# Patient Record
Sex: FEMALE | Race: WHITE | NOT HISPANIC OR LATINO | Employment: FULL TIME | URBAN - METROPOLITAN AREA
[De-identification: names, ages, dates, MRNs, and addresses within clinical notes are randomized per-mention and may not be internally consistent; named-entity substitution may affect disease eponyms.]

---

## 2018-08-30 ENCOUNTER — OFFICE VISIT (OUTPATIENT)
Dept: SURGERY | Facility: CLINIC | Age: 63
End: 2018-08-30

## 2018-08-30 VITALS
HEIGHT: 67 IN | DIASTOLIC BLOOD PRESSURE: 63 MMHG | BODY MASS INDEX: 21.87 KG/M2 | HEART RATE: 95 BPM | SYSTOLIC BLOOD PRESSURE: 141 MMHG | WEIGHT: 139.31 LBS

## 2018-08-30 DIAGNOSIS — C20 RECTAL CANCER: Primary | ICD-10-CM

## 2018-08-30 PROCEDURE — 99999 PR PBB SHADOW E&M-NEW PATIENT-LVL IV: CPT | Mod: PBBFAC,,, | Performed by: COLON & RECTAL SURGERY

## 2018-08-30 PROCEDURE — 45330 DIAGNOSTIC SIGMOIDOSCOPY: CPT | Mod: S$PBB,,, | Performed by: COLON & RECTAL SURGERY

## 2018-08-30 PROCEDURE — 99204 OFFICE O/P NEW MOD 45 MIN: CPT | Mod: PBBFAC | Performed by: COLON & RECTAL SURGERY

## 2018-08-30 PROCEDURE — 99205 OFFICE O/P NEW HI 60 MIN: CPT | Mod: 25,S$PBB,, | Performed by: COLON & RECTAL SURGERY

## 2018-08-30 PROCEDURE — 45330 DIAGNOSTIC SIGMOIDOSCOPY: CPT | Mod: PBBFAC | Performed by: COLON & RECTAL SURGERY

## 2018-08-30 RX ORDER — CALCIUM CARBONATE 200(500)MG
1 TABLET,CHEWABLE ORAL DAILY PRN
COMMUNITY
End: 2018-12-12

## 2018-08-30 RX ORDER — CHOLECALCIFEROL (VITAMIN D3) 25 MCG
1000 TABLET ORAL DAILY
COMMUNITY
End: 2018-11-27 | Stop reason: CLARIF

## 2018-08-30 RX ORDER — AMLODIPINE BESYLATE 10 MG/1
10 TABLET ORAL DAILY
COMMUNITY
End: 2018-11-27 | Stop reason: CLARIF

## 2018-08-30 RX ORDER — TRAMADOL HYDROCHLORIDE 50 MG/1
50 TABLET ORAL EVERY 6 HOURS PRN
COMMUNITY
End: 2018-12-12

## 2018-08-30 RX ORDER — IBUPROFEN 200 MG
200 TABLET ORAL EVERY 6 HOURS PRN
COMMUNITY

## 2018-08-30 NOTE — PROGRESS NOTES
[facility]  History & Physical    Patient Name: Susannah Xie  MRN: [id number]  Primary Care Provider: Primary Doctor No    Patient information was obtained from patient and past medical records.     Subjective:     Chief Complaint/Reason for Admission: rectal mass    History of Present Illness:  Patient is a 63 y.o. female with a PMH of COPD who presents with a history of rectal bleeding that started back in March. She was seen by her physician and was referred to have a colonoscopy. This was done 7/24/18 during which she was found to have a rectal mass as well as a cecal polyp. The rectal mass was ~5cm from the anal verge and was on the posterior wall. Biopsy of the mass was positive for adenocarcinoma. She also had a cecal polyp that was completely removed; pathology was a tubulovillous adenoma. She then underwent a MRI of the pelvis as well as a PET scan. She has no family history of colon cancer. She has not lost significant weight in the past few months. She has seen med and rad onc and a surgeon but presents today for a second opinion.    She had an appendectomy and  TAHBSO in the past. She smokes 1.5ppd x30+ years. She currently still smokes. She can climb a flight of stairs with some difficulty and gets dyspneic at the end. She has no history of an MI or CVA.    No current outpatient medications on file prior to visit.     No current facility-administered medications on file prior to visit.        Review of patient's allergies indicates:  Allergies not on file    Past Medical History:   Diagnosis Date    COPD (chronic obstructive pulmonary disease) with chronic bronchitis     Tobacco abuse      Past Surgical History:   Procedure Laterality Date    APPENDECTOMY      TOTAL ABDOMINAL HYSTERECTOMY W/ BILATERAL SALPINGOOPHORECTOMY       Family History     None        Tobacco Use    Smoking status: Current Every Day Smoker     Packs/day: 1.50     Years: 30.00     Pack years: 45.00     Types:  Cigarettes   Substance and Sexual Activity    Alcohol use: Yes     Frequency: Monthly or less     Drinks per session: 1 or 2     Binge frequency: Never    Drug use: No    Sexual activity: Not on file     Review of Systems   Constitutional: Negative for activity change, appetite change and unexpected weight change.   HENT: Negative.    Respiratory: Positive for cough and wheezing. Negative for shortness of breath.    Cardiovascular: Negative for chest pain and palpitations.   Gastrointestinal: Positive for anal bleeding and blood in stool. Negative for abdominal distention, abdominal pain, diarrhea, nausea and rectal pain.   Genitourinary: Negative for difficulty urinating and dysuria.   Musculoskeletal: Positive for back pain.   Skin: Negative.    Neurological: Negative.    Hematological: Negative.  Negative for adenopathy. Does not bruise/bleed easily.     Objective:     Vital Signs (Most Recent):  Pulse: 95 (08/30/18 0836)  BP: (!) 141/63 (08/30/18 0836) Vital Signs (24h Range):  [unfilled]     Weight: 63.2 kg (139 lb 5.3 oz)  Body mass index is 21.82 kg/m².    Physical Exam   Constitutional: She is oriented to person, place, and time. She appears well-developed and well-nourished. No distress.   HENT:   Head: Normocephalic and atraumatic.   Eyes: No scleral icterus.   Neck: Normal range of motion. Neck supple.   Cardiovascular: Normal rate and regular rhythm.   Pulmonary/Chest: Effort normal and breath sounds normal.   Abdominal: Soft. Bowel sounds are normal. She exhibits no distension and no mass. There is no tenderness.   Musculoskeletal: Normal range of motion.   Neurological: She is alert and oriented to person, place, and time.   Skin: Skin is warm and dry.     After verbal consent and 2 fleets enemas flexible sigmoidoscopy was performed the lesion was identified at 5 cm from the dentate line. There was space above the anorectal ring.  The lesion was tattooed  Significant Labs:  None    Significant  Diagnostics:  MRI pelvis 8/20/18:    1. Rectal neoplasm involving the middle and distal thirds of the rectum with extension to within 1mm of the mesorectal fascia in 2 locations.  2. Multiple suspicious perirectal lymph nodes  3. T3 (MRF positive) N2    Colonoscopy 7/24/18:  Ulcerated right posterior lateral rectal mass  Large flat polypoid lesion cecum    PET CT 8/20/18:  1. Rectal neoplasm with perirectal hypermetabolic lymph nodes and/or direct extension of tumor into the perirectal fat.  2. No evidence of distant metastatic disease.    PATHOLOGY 7/24/18:    1. Polyp, cecum:  - Tubulovillous adnoma with mild to focally moderate glandular atypia    2. Rectal mass, biopsy:  - Invasive moderately to focally poorly differentiated adenocarcinoma with moderate extracellular mucin production by tumor and a few tumor cells with signet ring morphology (poorly differentiated adenocarcinoma).   - Lymphovascular invasion is present.  - Adjacent tubulovillous adenoma with moderate glandular atypia  - Mucicarmine stain positive for mucin with extracellular mucin and within gland like spaces of invasive adenocarcinoma with appropriate staining of mucin in the control slide.    Assessment/Plan:     64 yo W presenting with rectal cancer    1. Flexible sigmoidoscopy done in clinic; revealed large rectal mass that ~5cm from the anal verge  2. She will need neoadjuvant chemoradiation. This can be coordinated closer to where she lives.  3. She will follow up after she has completed her neoadjuvant treatment to discuss surgery; planning for robotic resection in December 2018.  4. Smoking cessation counseling done.    Dottie Orellana MD  General Surgery  [facility]    She will follow up approximately 4-6 weeks month after completing neoadjuvant therapy.  Will plan on repeating an MRI at that visit also tentatively scheduling surgery for December 12th based on the prospective timing of 6 weeks of neoadjuvant  therapy and 10-12 weeks post treatment  Have seen and examined the patient with the fellow and agree with their plan.  SHREYA VELAZCO

## 2018-10-24 ENCOUNTER — TELEPHONE (OUTPATIENT)
Dept: SURGERY | Facility: CLINIC | Age: 63
End: 2018-10-24

## 2018-10-24 NOTE — TELEPHONE ENCOUNTER
Spoke with Cara Briceno's office and let her know we have her scheduled with us on 11/27 which should still fall in the 4-6 week timeframe after chemo completion.

## 2018-10-24 NOTE — TELEPHONE ENCOUNTER
----- Message from Shira Piedra sent at 10/24/2018  8:40 AM CDT -----  Contact: Cara tenorio/ Dr. Briceno Office in [place] [phone]  Cara called in regarding pt above. She had a delay in her treatment and she did not finished her chemo and cancer treatment until yesterday 10/23    Contact: Cara tenorio/ Dr. Briceno Office in [place] [phone]

## 2018-11-02 ENCOUNTER — TELEPHONE (OUTPATIENT)
Dept: SURGERY | Facility: CLINIC | Age: 63
End: 2018-11-02

## 2018-11-02 NOTE — TELEPHONE ENCOUNTER
----- Message from Sasha Costa sent at 11/2/2018  9:31 AM CDT -----  Contact: Pt:[phone]  .Needs Advice    Reason for call:Pt states she would like to speak with the nurse in regards to an appointment she has scheduled with .         Communication Preference:Pt:[phone]    Additional Information:

## 2018-11-06 ENCOUNTER — TELEPHONE (OUTPATIENT)
Dept: SURGERY | Facility: CLINIC | Age: 63
End: 2018-11-06

## 2018-11-06 NOTE — TELEPHONE ENCOUNTER
----- Message from Nayely Cooley sent at 11/6/2018 11:41 AM CST -----  Contact: self  Pt called in about wanting to speak with gregg grajeda. Pt would like to discuss something about her upcoming surgery      Pt can be reached at [phone]      TY

## 2018-11-13 ENCOUNTER — TELEPHONE (OUTPATIENT)
Dept: PREADMISSION TESTING | Facility: HOSPITAL | Age: 63
End: 2018-11-13

## 2018-11-13 DIAGNOSIS — C20 RECTAL CANCER: ICD-10-CM

## 2018-11-13 DIAGNOSIS — Z01.818 PRE-OP EVALUATION: Primary | ICD-10-CM

## 2018-11-13 NOTE — TELEPHONE ENCOUNTER
----- Message from Radha Perry LPN sent at 11/13/2018  8:48 AM CST -----  Pt is coming in 11/27/18 for her pre op visit. She will need medical clearance for surgery.  Her surgery date will probably be 12/13/18. On the 27th she does have several appts already.   Thanks   Radha

## 2018-11-13 NOTE — TELEPHONE ENCOUNTER
11/27 2p Stepan Garcia but this is all I have. Can you charge you appt. time are as you can see they will be late. DID NOT CALL PATIENT. Per your request.

## 2018-11-19 ENCOUNTER — TELEPHONE (OUTPATIENT)
Dept: SURGERY | Facility: CLINIC | Age: 63
End: 2018-11-19

## 2018-11-19 ENCOUNTER — ANESTHESIA EVENT (OUTPATIENT)
Dept: SURGERY | Facility: HOSPITAL | Age: 63
End: 2018-11-19

## 2018-11-19 ENCOUNTER — TELEPHONE (OUTPATIENT)
Dept: PREADMISSION TESTING | Facility: HOSPITAL | Age: 63
End: 2018-11-19

## 2018-11-19 DIAGNOSIS — Z01.818 PREOP TESTING: Primary | ICD-10-CM

## 2018-11-19 NOTE — TELEPHONE ENCOUNTER
Spoke with patient.  Patient would like to know if having a Bard Port is going to be a problem with having an MRI.  MRI contacted and they report that they do MRIs on patients with ports.  She should come in and they will evaluate it.  Also, she would like to know if she needs to have a colonoscopy before surgery.  Dr. Benavides will have to evaluate him before that is decided.

## 2018-11-19 NOTE — TELEPHONE ENCOUNTER
----- Message from Phyllis Trujillo RN sent at 11/19/2018 11:54 AM CST -----  Jg Arcos, This patient is a KR 4-Will need a POC/OPOC/Labs-Hem Prof/CMP/T&S/EKG appts., prior to surgery date of 12/12/18-XI Robotic Pull-Through,Coloanal Insertion-Catheter. Thank you. PJ

## 2018-11-19 NOTE — ANESTHESIA PREPROCEDURE EVALUATION
Anesthesia Assessment: Preoperative EQUATION    Planned Procedure: Procedure(s) (LRB):  XI ROBOTIC PULL-THROUGH, COLOANAL (N/A)  INSERTION-CATHETER (Left)  Requested Anesthesia Type:General  Surgeon: Jeyson Kelly MD  Service: Colon and Rectal  Known or anticipated Date of Surgery:12/12/2018    Surgeon notes: reviewed and Rectal cancer    Previous anesthesia records:Not available-Patient has had several surgeries done at [facility] facilities-s/p-Apppendectomy/s/p-SHERRIE w/Edison. Salpingoophorectomy    Last PCP note: No PCP at present  Tests already available:  No recent tests.      Plan:    Testing:  Hematology Profile, CMP, T&S and EKG     Patient  has previously scheduled Medical Appointment:Appointments on 11/27/18, prior to surgery date.    Navigation: Tests Scheduled. Labs-Hem Prof/CMP/T&S & EKG on 11/27/18 @ 11:30a & 12:20p             Consults scheduled.POC & Perioperative IM Consult on 11/27/18 @ 9a & 2p--RK 4             Results will be tracked by Preop Clinic.                Phyllis Trujillo RN  11/19/18 11/19/2018  Susannah Xie is a 63 y.o., female.    Pre-op Assessment         Review of Systems  Anesthesia Hx:  No problems with previous Anesthesia  History of prior surgery of interest to airway management or planning: Previous anesthesia: General colonoscopy 7/24/18; PORT PLACEMENT 9/2018 in [place] with general anesthesia.  Procedure performed at an [facility] Facility. Denies Family Hx of Anesthesia complications.    Social:  Smoker 0.5-1 ppd x 41 yrs; no alcohol   Hematology/Oncology:         -- Denies Anemia: Current/Recent Cancer. (rectal; s/p chemo, XRT last in 10/2018)   EENT/Dental:   Glasses   Cardiovascular:   Hypertension (off medication-Norvasc-only taken for 1-2 months; BP in /61) Denies MI.    Functional Capacity good / => 4 METS, active until March 2018-walking, housework, climb 1  FOS; denies CP, reports SOB with exertion which is not new    Pulmonary:   COPD (prn Ventolin inhaler) Shortness of breath (with walking but not new) Denies Sleep Apnea.    Renal/:  Renal/ Normal     Hepatic/GI:   GERD Rectal cancer   Musculoskeletal:  Spine Disorders: lumbar    Neurological:   Denies CVA. Denies Seizures.  Pain , onset is acute , location of lower abdomnen , quality of aching/dull , severity is a 7    Endocrine:   Denies Diabetes.    Psych:  Psychiatric Normal           Physical Exam  General:  Well nourished    Airway/Jaw/Neck:  Airway Findings: Mouth Opening: Normal Tongue: Normal  General Airway Assessment: Adult  Jaw/Neck Findings:     Neck ROM: Normal ROM      Dental:  Dental Findings: molar caps     Heart/Vascular:  Vascular Findings: (LEFT CHEST PORT)        Mental Status:  Mental Status Findings:  Cooperative, Alert and Oriented       Pt was seen in POC 11/27/18; Medical optimization: please see EPIC notes for recommendations of pre-op medical consultant, Dr Castro, for perioperative medical management./Maryanne Deshpande RN

## 2018-11-19 NOTE — PRE ADMISSION SCREENING
Anesthesia Assessment: Preoperative EQUATION    Planned Procedure: Procedure(s) (LRB):  XI ROBOTIC PULL-THROUGH, COLOANAL (N/A)  INSERTION-CATHETER (Left)  Requested Anesthesia Type:General  Surgeon: Jeyson Kelly MD  Service: Colon and Rectal  Known or anticipated Date of Surgery:12/12/2018    Surgeon notes: reviewed and Rectal cancer    Previous anesthesia records:Not available-Patient has had several surgeries done at [facility] facilities-s/p-Apppendectomy/s/p-SHERRIE w/Edison. Salpingoophorectomy    Last PCP note: No PCP at present  Tests already available:  No recent tests.      Plan:    Testing:  Hematology Profile, CMP, T&S and EKG     Patient  has previously scheduled Medical Appointment:Appointments on 11/27/18, prior to surgery date.    Navigation: Tests Scheduled. Labs-Hem Prof/CMP/T&S & EKG on ?             Consults scheduled.POC & Perioperative IM Consult on ?--RK 4             Results will be tracked by Preop Clinic.                Phyllis Trujillo RN  11/19/18

## 2018-11-19 NOTE — TELEPHONE ENCOUNTER
----- Message from Kelsey Winkler sent at 11/19/2018  8:04 AM CST -----  Contact: pt#[phone]  Needs Advice    Reason for call:Pt is schedule for mri and she states that she has a metal port in left chest for chemo   Bard power port is the name of it. She also have questions about a right side colonoscopy    Communication Preference:call    Additional Information:

## 2018-11-27 ENCOUNTER — HOSPITAL ENCOUNTER (OUTPATIENT)
Dept: RADIOLOGY | Facility: HOSPITAL | Age: 63
Discharge: HOME | End: 2018-11-27
Attending: COLON & RECTAL SURGERY

## 2018-11-27 ENCOUNTER — HOSPITAL ENCOUNTER (OUTPATIENT)
Dept: CARDIOLOGY | Facility: CLINIC | Age: 63
Discharge: HOME | End: 2018-11-27

## 2018-11-27 ENCOUNTER — HOSPITAL ENCOUNTER (OUTPATIENT)
Dept: PREADMISSION TESTING | Facility: HOSPITAL | Age: 63
Discharge: HOME | End: 2018-11-27
Attending: ANESTHESIOLOGY

## 2018-11-27 ENCOUNTER — INITIAL CONSULT (OUTPATIENT)
Dept: INTERNAL MEDICINE | Facility: CLINIC | Age: 63
End: 2018-11-27

## 2018-11-27 VITALS
WEIGHT: 116.88 LBS | TEMPERATURE: 99 F | BODY MASS INDEX: 18.35 KG/M2 | OXYGEN SATURATION: 100 % | HEIGHT: 67 IN | SYSTOLIC BLOOD PRESSURE: 132 MMHG | DIASTOLIC BLOOD PRESSURE: 61 MMHG | HEART RATE: 95 BPM

## 2018-11-27 VITALS
OXYGEN SATURATION: 100 % | WEIGHT: 116.88 LBS | SYSTOLIC BLOOD PRESSURE: 132 MMHG | DIASTOLIC BLOOD PRESSURE: 61 MMHG | HEART RATE: 95 BPM | TEMPERATURE: 99 F | RESPIRATION RATE: 18 BRPM | HEIGHT: 67 IN | BODY MASS INDEX: 18.35 KG/M2

## 2018-11-27 DIAGNOSIS — R39.11 URINARY HESITANCY: ICD-10-CM

## 2018-11-27 DIAGNOSIS — Z01.818 PRE-OP EVALUATION: ICD-10-CM

## 2018-11-27 DIAGNOSIS — J34.9 SINUS PROBLEM: ICD-10-CM

## 2018-11-27 DIAGNOSIS — R07.89 ATYPICAL CHEST PAIN: ICD-10-CM

## 2018-11-27 DIAGNOSIS — R50.9 FEVER, UNSPECIFIED FEVER CAUSE: ICD-10-CM

## 2018-11-27 DIAGNOSIS — R42 POSTURAL DIZZINESS: ICD-10-CM

## 2018-11-27 DIAGNOSIS — J44.9 CHRONIC OBSTRUCTIVE PULMONARY DISEASE, UNSPECIFIED COPD TYPE: ICD-10-CM

## 2018-11-27 DIAGNOSIS — C20 RECTAL CANCER: ICD-10-CM

## 2018-11-27 DIAGNOSIS — Z72.0 TOBACCO ABUSE: ICD-10-CM

## 2018-11-27 DIAGNOSIS — K21.9 GASTROESOPHAGEAL REFLUX DISEASE, ESOPHAGITIS PRESENCE NOT SPECIFIED: ICD-10-CM

## 2018-11-27 DIAGNOSIS — R09.89 PALPABLE ABDOMINAL AORTA: ICD-10-CM

## 2018-11-27 DIAGNOSIS — Z01.818 PREOP EXAMINATION: Primary | ICD-10-CM

## 2018-11-27 LAB
CREAT SERPL-MCNC: 0.6 MG/DL (ref 0.5–1.4)
SAMPLE: NORMAL

## 2018-11-27 PROCEDURE — 99244 OFF/OP CNSLTJ NEW/EST MOD 40: CPT | Mod: S$PBB,,, | Performed by: HOSPITALIST

## 2018-11-27 PROCEDURE — 99213 OFFICE O/P EST LOW 20 MIN: CPT | Mod: PBBFAC,25 | Performed by: HOSPITALIST

## 2018-11-27 PROCEDURE — 93010 ELECTROCARDIOGRAM REPORT: CPT | Mod: S$PBB,,, | Performed by: INTERNAL MEDICINE

## 2018-11-27 PROCEDURE — 72197 MRI PELVIS W/O & W/DYE: CPT | Mod: 26,,, | Performed by: RADIOLOGY

## 2018-11-27 PROCEDURE — A9585 GADOBUTROL INJECTION: HCPCS | Performed by: COLON & RECTAL SURGERY

## 2018-11-27 PROCEDURE — 72197 MRI PELVIS W/O & W/DYE: CPT | Mod: TC

## 2018-11-27 PROCEDURE — 93005 ELECTROCARDIOGRAM TRACING: CPT | Mod: PBBFAC | Performed by: INTERNAL MEDICINE

## 2018-11-27 PROCEDURE — 99999 PR PBB SHADOW E&M-EST. PATIENT-LVL III: CPT | Mod: PBBFAC,,, | Performed by: HOSPITALIST

## 2018-11-27 PROCEDURE — 25500020 PHARM REV CODE 255: Performed by: COLON & RECTAL SURGERY

## 2018-11-27 RX ORDER — ACETAMINOPHEN 500 MG
5000 TABLET ORAL DAILY
COMMUNITY
End: 2018-12-12

## 2018-11-27 RX ORDER — LOPERAMIDE HCL 2 MG
2 TABLET ORAL 4 TIMES DAILY PRN
COMMUNITY

## 2018-11-27 RX ORDER — ESOMEPRAZOLE MAGNESIUM 20 MG/1
20 GRANULE, DELAYED RELEASE ORAL 2 TIMES DAILY
COMMUNITY

## 2018-11-27 RX ORDER — OXYCODONE HYDROCHLORIDE 5 MG/1
5 CAPSULE ORAL EVERY 6 HOURS PRN
COMMUNITY
End: 2018-12-12

## 2018-11-27 RX ORDER — ALBUTEROL SULFATE 90 UG/1
2 AEROSOL, METERED RESPIRATORY (INHALATION) 2 TIMES DAILY PRN
COMMUNITY

## 2018-11-27 RX ORDER — GADOBUTROL 604.72 MG/ML
6 INJECTION INTRAVENOUS
Status: COMPLETED | OUTPATIENT
Start: 2018-11-27 | End: 2018-11-27

## 2018-11-27 RX ADMIN — GADOBUTROL 6 ML: 604.72 INJECTION INTRAVENOUS at 11:11

## 2018-11-27 NOTE — HPI
History of present illness- I had the pleasure of meeting this pleasant 63 y.o. lady in the pre op clinic prior to her elective Abdominal surgery. The patient is new to me . Susannah was accompanied by daughter in law Jordan.    I have obtained the history by speaking to the patient and by reviewing the electronic health records.    Events leading up to surgery / History of presenting illness -    Rectal cancer  Has a sensation of having to have multiple bowel movements a day since March 2018   Low  Back pain, abdominal cramping  since March 2018   Started with rectal bleeding around May / June 2018  Had Radiation - 28 treatments- finished Oct 23 rd 2018  Had chemotherapy  - finished Oct 24 th 2018   As per per her, planned for chemo , post surgery  No longer has rectal bleeding   As per her , radiation caused mucus, per rectum   She has been troubled with moderate abdominal cramping that has got better  . Pain decreases with pain medication passing flatus , Tums, pain Medication .      Relevant health conditions of significance for the perioperative period/ History of presenting illness -    Patient Active Problem List    Diagnosis Date Noted    Screening for colon cancer 12/12/2018    COPD (chronic obstructive pulmonary disease) 11/27/2018    Rectal cancer 11/27/2018    Sinus problem 11/27/2018    Tobacco abuse 11/27/2018    Atypical chest pain 11/27/2018    Acid reflux 11/27/2018    Postural dizziness 11/27/2018    Urinary hesitancy 11/27/2018    Fever 11/27/2018    Palpable abdominal aorta 11/27/2018     Not known to have heart disease , Diabetes Mellitus,HTN

## 2018-11-27 NOTE — ASSESSMENT & PLAN NOTE
I  Informed about risk of wound healing problem ,infection,lung complications,thrombosis with tobacco use    I suggested to consider stopping  smoking tobacco for its benefits in the davonte operative period and in the long term  Suggested having a quit date ( she choose tomorrow ) and work towards it

## 2018-11-27 NOTE — LETTER
November 27, 2018      JADA Benavides MD  [address] [facility] [address]           [facility] - Pre Op Consult  [address] [facility] [address]  Phone: [phone]          Patient: Susannah Xie   MR Number: [id number]   YOB: 1955   Date of Visit: 11/27/2018       Dear Dr. Rhonda G Leopold:    Thank you for referring Susannah Xie to me for evaluation. Attached you will find relevant portions of my assessment and plan of care.    If you have questions, please do not hesitate to call me. I look forward to following Susannah Xie along with you.    Sincerely,    Lidya Castro MD    Enclosure  CC:  Rhonda G Leopold, MD    If you would like to receive this communication electronically, please contact [email or web address] or [phone] to request more information on [facility] access.    For providers and/or their staff who would like to refer a patient to [facility], please contact us through our one-stop-shop provider referral line, [facility], at [phone].    If you feel you have received this communication in error or would no longer like to receive these types of communications, please e-mail [email or web address]

## 2018-11-27 NOTE — ASSESSMENT & PLAN NOTE
Had once 11 th November 2018   Started with upper abdominal discomfort and worked its way through to the the Rt upper chest     Radiation-  None   After a heavier meal , as she was reclining    Associated with- no shortness of breath, sweating, nausea, vomiting diarrhea  CV risks   HTN-None  DM- None   HLD-un known   Tobacco - Yes  Family history -  Premature heart disease   Sounds GI in nature     Offered cardiology evaluation to reassure her which he deferred at this time

## 2018-11-27 NOTE — DISCHARGE INSTRUCTIONS
Your surgery has been scheduled for:__________________________________________    You should report to:  ____[facility], located on the [place] side of the first floor of the           [facility] ([phone])  ____[facility], located on the [facility] side of the            Second floor of the [facility] ([phone])  ____3rd Floor [facility] located on the [facility] side of the [facility] [phone]  Please Note   - Tell your doctor if you take Aspirin, products containing Aspirin, herbal medications  or blood thinners, such as Coumadin, Ticlid, or Plavix.  (Consult your provider regarding holding or stopping before surgery).  - Arrange for someone to drive you home following surgery.  You will not be allowed to leave the surgical facility alone or drive yourself home following sedation and anesthesia.  Before Surgery  - Stop taking all herbal medications 14days prior to surgery  - No Motrin/Advil (Ibuprofen) 7 days before surgery  - No Aleve (Naproxen) 7 days before surgery  - Stop Taking Asprin, products containing Asprin _____days before surgery  - Stop taking blood thinners_______days before surgery  - No Goody's/BC  Powder 7 days before surgery  - Refrain from drinking alcoholic beverages for 24hours before and after surgery  - Stop or limit smoking _________days before surgery  - You may take Tylenol for pain    Night before Surgery  NOTHING TO EAT OR DRINK AFTER MIDNIGHT OR FOLLOW SURGEON'S INSTRUCTIONS  - Take a shower or bath (shower is recommended).  Bathe with Hibiclens soap or an antibacterial soap from the neck down.  If not supplied by your surgeon, hibiclens soap will need to be purchased over the counter in pharmacy.  Rinse soap off thoroughly.  - Shampoo your hair with your regular shampoo  The Day of Surgery  ·  If you are told to take medication on the morning of surgery, it may be taken with  a sip of water.   - Take another bath or shower with hibiclens or any antibacterial soap, to reduce the chance of infection.  - Take heart and blood pressure medications with a small sip of water, as advised by the perioperative team.  - Do not take fluid pills  - You may brush your teeth and rinse your mouth, but do not swall any additional water.   - Do not apply perfumes, powder, body lotions or deodorant on the day of surgery.  - Nail polish should be removed.  - Do not wear makeup or moisturizer  - Wear comfortable clothes, such as a button front shirt and loose fitting pants.  - Leave all jewelry, including body piercings, and valuables at home.    - Bring any devices you will neeed after surgery such as crutches or canes.  - If you have sleep apnea, please bring your CPAP machine  In the event that your physical condition changes including the onset of a cold or respiratory illness, or if you have to delay or cancel your surgery, please notify your surgeon.      Anesthesia: General Anesthesia  Youre due to have surgery. During surgery, youll be given medication called anesthesia. (It is also called anesthetic.) This will keep you comfortable and pain-free. Your anesthesia provider will use general anesthesia. This sheet tells you more about it.  What is general anesthesia?     You are watched continuously during your procedure by the anesthesia provider   General anesthesia puts you into a state like deep sleep. It goes into the bloodstream (IV anesthetics), into the lungs (gas anesthetics), or both. You feel nothing during the procedure. You will not remember it. During the procedure, the anesthesia provider monitors you continuously. He or she checks your heart rate and rhythm, blood pressure, breathing, and blood oxygen.  · IV Anesthetics. IV anesthetics are given through an IV line in your arm. Theyre often given first. This is so you are asleep before a gas anesthetic is started. Some kinds of IV  anesthetics relieve pain. Others relax you. Your doctor will decide which kind is best in your case.  · Gas Anesthetics. Gas anesthetics are breathed into the lungs. They are often used to keep you asleep. They can be given through a facemask or a tube placed in your larynx or trachea (breathing tube).  ? If you have a facemask, your anesthesia provider will most likely place it over your nose and mouth while youre still awake. Youll breathe oxygen through the mask as your IV anesthetic is started. Gas anesthetic may be added through the mask.  ? If you have a tube in the larynx or trachea, it will be inserted into your throat after youre asleep.  Anesthesia tools and medications  You will likely have:  · IV anesthetics. These are put into an IV line into your bloodstream.  · Gas anesthetics. You breathe these anesthetics into your lungs, where they pass into your bloodstream.  · Pulse oximeter. This is a small clip that is attached to the end of your finger. This measures your blood oxygen level.  · Electrocardiography leads (electrodes). These are small sticky pads that are placed on your chest. They record your heart rate and rhythm.  · Blood pressure cuff. This reads your blood pressure.  Risks and possible complications  General anesthesia has some risks. These include:  · Breathing problems  · Nausea and vomiting  · Sore throat or hoarseness (usually temporary)  · Allergic reaction to the anesthetic  · Irregular heartbeat (rare)  · Cardiac arrest (rare)   Anesthesia safety  · Follow all instructions you are given for how long not to eat or drink before your procedure.  · Be sure your doctor knows what medications and drugs you take. This includes over-the-counter medications, herbs, supplements, alcohol or other drugs. You will be asked when those were last taken.  · Have an adult family member or friend drive you home after the procedure.  · For the first 24 hours after your surgery:  ? Do not drive or use  heavy equipment.  ? Have a trusted family member or spouse make important decisions or sign documents.  ? Avoid alcohol.  ? Have a responsible adult stay with you. He or she can watch for problems and help keep you safe.  Date Last Reviewed: 10/16/2014  © 8376-8344 [employer]. [address] [place] [address]. All rights reserved. This information is not intended as a substitute for professional medical care. Always follow your healthcare professional's instructions.

## 2018-11-27 NOTE — PROGRESS NOTES
Andrey Quan - Pre Op Consult  Progress Note    Patient Name: Susannah Xie  MRN: [id number]  Date of Evaluation- 12/19/2018  PCP- Primary Doctor No    Future cases for Susannah Xie [[id number]]     Case ID Status Date Time Jack Procedure Provider Location    [id number] [facility] 12/12/2018  8:00  XI ROBOTIC PULL-THROUGH, COLOANAL  [facility] OR 2ND FLR          HPI:  History of present illness- I had the pleasure of meeting this pleasant 63 y.o. lady in the pre op clinic prior to her elective Abdominal surgery. The patient is new to me . Susannah was accompanied by daughter in law Jordan.    I have obtained the history by speaking to the patient and by reviewing the electronic health records.    Events leading up to surgery / History of presenting illness -    Rectal cancer  Has a sensation of having to have multiple bowel movements a day since March 2018   Low  Back pain, abdominal cramping  since March 2018   Started with rectal bleeding around May / June 2018  Had Radiation - 28 treatments- finished Oct 23 rd 2018  Had chemotherapy  - finished Oct 24 th 2018   As per per her, planned for chemo , post surgery  No longer has rectal bleeding   As per her , radiation caused mucus, per rectum   She has been troubled with moderate abdominal cramping that has got better  . Pain decreases with pain medication passing flatus , Tums, pain Medication .      Relevant health conditions of significance for the perioperative period/ History of presenting illness -    Patient Active Problem List    Diagnosis Date Noted    Screening for colon cancer 12/12/2018    COPD (chronic obstructive pulmonary disease) 11/27/2018    Rectal cancer 11/27/2018    Sinus problem 11/27/2018    Tobacco abuse 11/27/2018    Atypical chest pain 11/27/2018    Acid reflux 11/27/2018    Postural dizziness 11/27/2018    Urinary hesitancy 11/27/2018    Fever 11/27/2018    Palpable abdominal aorta 11/27/2018     Not known to have heart disease , Diabetes Mellitus,HTN  "      Subjective/ Objective:          Chief complaint-Preoperative evaluation, Perioperative Medical management, complication reduction plan     Active cardiac conditions- none    Revised cardiac risk index predictors- none    Functional capacity -Examples of physical activity works at a [employer], , walks in the yard , 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   Winded on heavier exertion, not new ,stable over time     Review of Systems   Constitutional: Negative for fever.        No unusual weight changes       HENT:        STOPBANG score 1 / 8    Age over 50        Eyes:        No new visual changes   Respiratory:          No change in color , consistency  No Hemoptysis   Cardiovascular:        As noted   Gastrointestinal:        No overt GI/ blood losses     Endocrine:        Prednisone use > 20 mg daily for 3 weeks- none    Genitourinary:         urinary hesitancy    Musculoskeletal:        Long standing back pain    Neurological: Negative for syncope.        No unilateral weakness   Hematological:        Current use of Anticoagulants  Current use of Antiplatelet agents  None    Psychiatric/Behavioral:        No Depression,Anxiety       No vascular stenting     No past medical history pertinent negatives.        No anesthesia, bleeding, cardiac problems, PONV with previous surgeries/procedures.  Medications and Allergies reviewed in epic.   FH- No anesthesia,bleeding / venous thrombosis ,in family   Lives with  , who can help    Physical Exam  Blood pressure 132/61, pulse 95, temperature 99 °F (37.2 °C), temperature source Oral, height 5' 7" (1.702 m), weight 53 kg (116 lb 14.4 oz), SpO2 100 %.      Physical Exam  Constitutional- Vitals - Body mass index is 18.31 kg/m².,   Vitals:    11/27/18 1349   BP: " 132/61   Pulse: 95   Temp: 99 °F (37.2 °C)     General appearance-Conscious,Coherent  Eyes- No conjunctival icterus,pupils  round  and reactive to light   ENT-Oral cavity- moist  , Hearing grossly normal   Neck- No thyromegaly ,Trachea -central, No jugular venous distension,   No Carotid Bruit   Cardiovascular -Heart Sounds- Normal  and  no murmur   , No gallop rhythm   Respiratory - Normal Respiratory Effort, Normal breath sounds,  no wheeze  and  no forced expiratory wheeze    Peripheral pitting pedal edema-- none , no calf pain   Gastrointestinal -Soft abdomen, No palpable masses, Non Tender,Liver,Spleen not palpable. No-- free fluid and shifting dullness  Musculoskeletal- No finger Clubbing. Strength grossly normal   Lymphatic-No Palpable cervical, axillary,Inguinal lymphadenopathy   Psychiatric - normal effect,Orientation  Rt Dorsalis pedis pulses-palpable    Lt Dorsalis pedis pulses- palpable   Rt Posterior tibial pulses -palpable   Left posterior tibial pulses -palpable   Miscellaneous -  no renal bruit, no aortic bruit and palpable abdominal aorta    Investigations  Lab and Imaging have been reviewed in epic.    Review of Medicine tests    EKG- I had independently reviewed the EKG from--today   No acute changes   Report pending     Review of clinical lab tests:  Lab Results   Component Value Date    CREATININE 0.6 11/27/2018    CREATININE 0.6 11/27/2018    HGB 12.8 11/27/2018     11/27/2018           Review of old records- Was done and information gathered regards to events leading to surgery and health conditions of significance in the perioperative period.        Preoperative cardiac risk assessment-  The patient does not have any active cardiac conditions . Revised cardiac risk index predictors- 0---.Functional capacity is more than 4 Mets. She will be undergoing a Abdominal procedure that carries a intermediate risk     The estimated risk of the rate of adverse cardiac outcomes  0.4%    No further  cardiac work up is indicated prior to proceeding with the surgery     Orders Placed This Encounter    US Abdominal Aorta       American Society of Anesthesiologists Physical status classification ( ASA ) class: 3     Postoperative pulmonary complication risk assessment:      ARISCAT ( Canet) risk index- risk class -  Low, if duration of surgery is under 3 hours, intermediate, if duration of surgery is over 3 hours      Mario Alberto Respiratory failure index- percentage risk of respiratory failure: 0.5 %     Assessment/Plan:     Rectal cancer  For surgery     Sinus problem  Gets sinus problem twice a year   May , November    Works with people   Started with sinus , chest congestion last night   Coughs Clear phlegm most days  Currently no fever   Suggested avoidance of steroid , if possible that she usually gets   Call, if needed      COPD (chronic obstructive pulmonary disease)  She is unsure of this diagnosis  She coughs clear phlegm most days   No  wheezing on a regular basis   Uses Ventolin as needed   Gets SOB , on heavier exertion   Usually not troubled  With SOB chronic phlegm   No  oxygen use , under pulmonary care   No suggestion of advanced lung disease based on her symptoms    Tobacco abuse  I  Informed about risk of wound healing problem ,infection,lung complications,thrombosis with tobacco use    I suggested to consider stopping  smoking tobacco for its benefits in the davonte operative period and in the long term  Suggested having a quit date ( she choose tomorrow ) and work towards it     Atypical chest pain  Had once 11 th November 2018   Started with upper abdominal discomfort and worked its way through to the the Rt upper chest     Radiation-  None   After a heavier meal , as she was reclining    Associated with- no shortness of breath, sweating, nausea, vomiting diarrhea  CV risks   HTN-None  DM- None   HLD-un known   Tobacco - Yes  Family history -  Premature heart disease   Sounds GI in nature      Offered cardiology evaluation to reassure her which he deferred at this time       Acid reflux  GERD-  I suggest continuation of the Proton pump inhibitor in the perioperative period . I suggest aspiration precautions    Postural dizziness  Gets dizzy on position changes   Suggested to change positions gradually and to stay hydrated     Urinary hesitancy  Increased risk of post operative urinary retention    Fever  Suggested monitoring temperature and to call     Palpable abdominal aorta  Not known to have aneurysm   US scan from 12/7/2018 - showed - Atherosclerotic changes. No abdominal aortic aneurysm identified            Preventive perioperative care    Thromboembolic prophylaxis:  Her risk factors for thrombosis include cancer ,  tobacco use, surgical procedure and age.I suggest  thromboembolic prophylaxis ( mechanical/pharmacological, weighing the risk benefits of pharmacological agent use considering davonte procedural bleeding )  during the perioperative period.I suggested being active in the post operative period.      Postoperative pulmonary complication prophylaxis-Risk factors for post operative pulmonary complications include ASA class >2, tobacco use, COPD and proximity of the surgical site to the lungs- I suggest tobacco smoking cessation, incentive spirometry use, early ambulation, end tidal carbon dioxide monitoring and pain control so as to avoid diaphragmatic splinting  , oral care , head end of bed elevation      Renal complication prophylaxis-I suggest keeping her well hydrated  2 litre's of water a day      Surgical site Infection Prophylaxis-I  suggest appropriate antibiotic for Prophylaxis against Surgical site infections     In view of LUTS, rectal procedure the patient  is at risk of postoperative urinary retention.  I suggest avoidance / minimizing the of  Benzodiazepines,Anticholinergic medication,antihistamines ( Benadryl) , if possible in the perioperative period. I suggest using the  minimum possible use of opioids for the minimum period of time in the perioperative period. Benadryl avoidance suggested      This visit was focused on Preoperative evaluation, Perioperative Medical management, complication reduction plans. I suggest that the patient follows up with primary care or relevant sub specialists for ongoing health care.    I appreciate the opportunity to be involved in this patients care. Please feel free to contact me if there were any questions about this consultation.    Patient is optimized     Patient was instructed to call and update me about any changes to health,  medication, office visits ,testing out side of the [facility] operative care center , hospitalizations between now and surgery     Lidya Castro MD  Perioperative Medicine  [facility]   Pager [phone]  ---  11/27- 16 29     EKG report   Normal sinus rhythm  Normal ECG  No previous ECGs available  ---  11/29- 7 32     Obtained CT abdomen scan result from Sept 2018   No mention of Aorta in the report   Will check US aorta  Called and spoke to her    Plans on coming back to [facility] to see surgeon   Surgery moved on 12/20   No fever   Trying on quitting on tobacco   ---  12/3- 8 49     Called and spoke to her   We discussed possibility of having US closer to home   She feels that she can have the US aorta with primary care   Call, if needed   ---  12/7- 18 07     Returned her call   Had the US abdomen today   No fever   Suggested quitting tobacco   ----  12/10- 8 46     She e mailed her US aorta result   US scan from 12/7/2018 - showed - Atherosclerotic changes. No abdominal aortic aneurysm identified  -----  12/19- 8 41    Called to follow up , spoke to her, to address any concerns with the up coming surgery or any questions on Medication instructions -  Doing good ,No changes to Medication, Health -  Has cut down on tobacco use   Suggested quitting tobacco   Nexium helping   Not troubled with Acid  reflux  Call, if needed

## 2018-11-27 NOTE — ASSESSMENT & PLAN NOTE
Not known to have aneurysm   US scan from 12/7/2018 - showed - Atherosclerotic changes. No abdominal aortic aneurysm identified

## 2018-11-27 NOTE — OUTPATIENT SUBJECTIVE & OBJECTIVE
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   Winded on heavier exertion, not new ,stable over time     Review of Systems   Constitutional: Negative for fever.        No unusual weight changes       HENT:        STOPBANG score 1 / 8    Age over 50        Eyes:        No new visual changes   Respiratory:          No change in color , consistency  No Hemoptysis   Cardiovascular:        As noted   Gastrointestinal:        No overt GI/ blood losses     Endocrine:        Prednisone use > 20 mg daily for 3 weeks- none    Genitourinary:         urinary hesitancy    Musculoskeletal:        Long standing back pain    Neurological: Negative for syncope.        No unilateral weakness   Hematological:        Current use of Anticoagulants  Current use of Antiplatelet agents  None    Psychiatric/Behavioral:        No Depression,Anxiety       No vascular stenting     No past medical history pertinent negatives.        No anesthesia, bleeding, cardiac problems, PONV with previous surgeries/procedures.  Medications and Allergies reviewed in epic.   FH- No anesthesia,bleeding / venous thrombosis ,in family   Lives with  , who can help    Physical Exam  Blood pressure 132/61, pulse 95, temperature 99 °F (37.2 °C), temperature source Oral, height 5' 7" (1.702 m), weight 53 kg (116 lb 14.4 oz), SpO2 100 %.      Physical Exam  Constitutional- Vitals - Body mass index is 18.31 kg/m².,   Vitals:    11/27/18 1349   BP: " 132/61   Pulse: 95   Temp: 99 °F (37.2 °C)     General appearance-Conscious,Coherent  Eyes- No conjunctival icterus,pupils  round  and reactive to light   ENT-Oral cavity- moist  , Hearing grossly normal   Neck- No thyromegaly ,Trachea -central, No jugular venous distension,   No Carotid Bruit   Cardiovascular -Heart Sounds- Normal  and  no murmur   , No gallop rhythm   Respiratory - Normal Respiratory Effort, Normal breath sounds,  no wheeze  and  no forced expiratory wheeze    Peripheral pitting pedal edema-- none , no calf pain   Gastrointestinal -Soft abdomen, No palpable masses, Non Tender,Liver,Spleen not palpable. No-- free fluid and shifting dullness  Musculoskeletal- No finger Clubbing. Strength grossly normal   Lymphatic-No Palpable cervical, axillary,Inguinal lymphadenopathy   Psychiatric - normal effect,Orientation  Rt Dorsalis pedis pulses-palpable    Lt Dorsalis pedis pulses- palpable   Rt Posterior tibial pulses -palpable   Left posterior tibial pulses -palpable   Miscellaneous -  no renal bruit, no aortic bruit and palpable abdominal aorta    Investigations  Lab and Imaging have been reviewed in epic.    Review of Medicine tests    EKG- I had independently reviewed the EKG from--today   No acute changes   Report pending     Review of clinical lab tests:  Lab Results   Component Value Date    CREATININE 0.6 11/27/2018    CREATININE 0.6 11/27/2018    HGB 12.8 11/27/2018     11/27/2018           Review of old records- Was done and information gathered regards to events leading to surgery and health conditions of significance in the perioperative period.    Outpatient Subjective & Objective

## 2018-11-27 NOTE — ASSESSMENT & PLAN NOTE
Gets sinus problem twice a year   May , November    Works with people   Started with sinus , chest congestion last night   Coughs Clear phlegm most days  Currently no fever   Suggested avoidance of steroid , if possible that she usually gets   Call, if needed

## 2018-11-27 NOTE — ASSESSMENT & PLAN NOTE
She is unsure of this diagnosis  She coughs clear phlegm most days   No  wheezing on a regular basis   Uses Ventolin as needed   Gets SOB , on heavier exertion   Usually not troubled  With SOB chronic phlegm   No  oxygen use , under pulmonary care   No suggestion of advanced lung disease based on her symptoms

## 2018-11-29 ENCOUNTER — TELEPHONE (OUTPATIENT)
Dept: SURGERY | Facility: CLINIC | Age: 63
End: 2018-11-29

## 2018-11-29 NOTE — TELEPHONE ENCOUNTER
----- Message from Sasha Costa sent at 11/29/2018  9:06 AM CST -----  Contact: Pt:[phone]  .Needs Advice    Reason for call:Pt called and states she would like to speak with the nurse in regards to an appointment. Pt states she has some questions and concerns.         Communication Preference:Pt:[phone]    Additional Information:

## 2018-11-29 NOTE — TELEPHONE ENCOUNTER
Spoke with patient.  She would like to know if she needs to have another colonoscopy and ct scan done before she has surgery.  Radha and I will get with Dr. Benavides and check with him about the questions that she has.

## 2018-11-30 ENCOUNTER — TELEPHONE (OUTPATIENT)
Dept: SURGERY | Facility: CLINIC | Age: 63
End: 2018-11-30

## 2018-11-30 NOTE — TELEPHONE ENCOUNTER
----- Message from Sasha Costa sent at 11/30/2018 12:33 PM CST -----  Contact: Pt:[phone]  .Needs Advice    Reason for call:Pt called and states she would like to speak with the nurse in regards to some question about an appointment.         Communication Preference:Pt:[phone]    Additional Information:

## 2018-11-30 NOTE — TELEPHONE ENCOUNTER
Spoke with patient and informed her that I will get with Radha about a date for her to have surgery.

## 2018-12-04 ENCOUNTER — TELEPHONE (OUTPATIENT)
Dept: ENDOSCOPY | Facility: HOSPITAL | Age: 63
End: 2018-12-04

## 2018-12-04 ENCOUNTER — TELEPHONE (OUTPATIENT)
Dept: SURGERY | Facility: CLINIC | Age: 63
End: 2018-12-04

## 2018-12-04 DIAGNOSIS — D49.0 COLORECTAL NEOPLASM: ICD-10-CM

## 2018-12-04 DIAGNOSIS — C20 RECTAL CANCER: Primary | ICD-10-CM

## 2018-12-04 DIAGNOSIS — Z12.11 SPECIAL SCREENING FOR MALIGNANT NEOPLASMS, COLON: Primary | ICD-10-CM

## 2018-12-04 RX ORDER — SODIUM, POTASSIUM,MAG SULFATES 17.5-3.13G
1 SOLUTION, RECONSTITUTED, ORAL ORAL ONCE
Qty: 1 BOTTLE | Refills: 0 | Status: SHIPPED | OUTPATIENT
Start: 2018-12-04 | End: 2018-12-04

## 2018-12-04 NOTE — TELEPHONE ENCOUNTER
----- Message from Kelsey Winkler sent at 12/4/2018  9:27 AM CST -----  Contact: pt#[phone]  Needs Advice    Reason for call:Pt wants to speak with but she did not want to leave detail. She states that you already know what it's about          Communication Preference:call    Additional Information:

## 2018-12-04 NOTE — TELEPHONE ENCOUNTER
----- Message from Shira Haro MA sent at 12/4/2018  3:42 PM CST -----  Contact: Belinda tenorio/Dr. Italo Light  [phone]  Needs Advice    Reason for call: I need to get the ICD code for why you need the ctscan chest/abdomen/pelvic for her appointment on Wednesday, 12-12-18.  Her surgery is on Thursday, 12-20-18 and she needs to bring the test results with her for the appointment on Wednesday, 12-12-18        Communication Preference:  Phone# above    Additional Information:  na

## 2018-12-10 ENCOUNTER — ANESTHESIA EVENT (OUTPATIENT)
Dept: ENDOSCOPY | Facility: HOSPITAL | Age: 63
End: 2018-12-10

## 2018-12-11 ENCOUNTER — TELEPHONE (OUTPATIENT)
Dept: SURGERY | Facility: CLINIC | Age: 63
End: 2018-12-11

## 2018-12-11 NOTE — TELEPHONE ENCOUNTER
Spoke with patient and informed her that Lizzy will talk to her about the prep for a colonoscopy.  Patient transferred to Lizzy.

## 2018-12-11 NOTE — TELEPHONE ENCOUNTER
----- Message from Sasha Costa sent at 12/11/2018 10:40 AM CST -----  Contact: Pt:[phone]  .Needs Advice    Reason for call:Pt called and states she would like to speak with the nurse in regards to there appointment on tomorrow         Communication Preference:Pt:[phone]    Additional Information:

## 2018-12-11 NOTE — PROGRESS NOTES
HPI:  Susannah Xie is a 63 y.o. female with history of low rectal cancer status post long course neoadjuvant therapy.  She has had good clinical response.  She has been moving her bowels better.  She denies any further bleeding. Her appetite and energy levels are improving.  She denies any abdominal pain or anal pain.      Past Medical History:   Diagnosis Date    Cancer     COPD (chronic obstructive pulmonary disease) with chronic bronchitis     GERD (gastroesophageal reflux disease)     Tobacco abuse         Past Surgical History:   Procedure Laterality Date    APPENDECTOMY      PORTACATH PLACEMENT Left     chest    TOTAL ABDOMINAL HYSTERECTOMY W/ BILATERAL SALPINGOOPHORECTOMY         Review of patient's allergies indicates:   Allergen Reactions    Ceclor [cefaclor] Anaphylaxis       Family History   Problem Relation Age of Onset    Diabetes Mother     Lung cancer Father     Heart disease Brother 53       Social History     Socioeconomic History    Marital status:      Spouse name: Not on file    Number of children: Not on file    Years of education: Not on file    Highest education level: Not on file   Social Needs    Financial resource strain: Not on file    Food insecurity - worry: Not on file    Food insecurity - inability: Not on file    Transportation needs - medical: Not on file    Transportation needs - non-medical: Not on file   Occupational History    Not on file   Tobacco Use    Smoking status: Current Every Day Smoker     Packs/day: 1.50     Years: 30.00     Pack years: 45.00     Types: Cigarettes    Smokeless tobacco: Never Used   Substance and Sexual Activity    Alcohol use: Yes     Frequency: Monthly or less     Drinks per session: 1 or 2     Binge frequency: Never     Comment: seldom    Drug use: No    Sexual activity: Not on file   Other Topics Concern    Not on file   Social History Narrative    Not on file       ROS:  GENERAL: No fever, chills, fatigability or  "weight loss.  Integument: No rashes, redness, icterus  CHEST: Denies VASQUEZ, cyanosis, wheezing, cough and sputum production.  CARDIOVASCULAR: Denies chest pain, PND, orthopnea or reduced exercise tolerance.  GI: Denies abd pain, dysphagia, nausea, vomiting, no hematemesis   : Denies burning on urination, no hematuria, no bacteriuria  MSK: No deformities, swelling, joint pain swelling  Neurologic: No HAs, seizures, weakness, paresthesias, gait problems    PE:  General appearance thin in no acute distress  /60 (BP Location: Left arm, Patient Position: Sitting, BP Method: Large (Automatic))   Pulse 79   Ht 5' 7" (1.702 m)   BMI 18.17 kg/m²     Sclera/ Skin anicteric  LN nonpalpable  AT NC EOMI  Neck supple trachea midline   Chest symmetric, nl excursion, no retractions, breathing comfortably  Abdomen well-healed low midline incision  ND soft NT.  no masses, no organomegaly  EXT - no CCE  Neuro:  Mood/ affect nl, alert and oriented x 3, moves all ext's, gait nl    Rectal  Inspection external hemorrhoids, mildly edematous, no thrombosis  TAMI normal tone, good squeeze, palpable low mass 4 cm above anal verge 1 cm above anorectal ring but lowest on the right side      Assessment:  Low rectal adenocarcinoma status post neoadjuvant chemotherapy and radiation therapy    Plan:  I have discussed with the patient the indications for ultra LAR, colonic J pouch anal anastomosis and the need for temporary ileostomy.  The ultimate functional outcome of surgical reconstruction has been described to the patient..The expected hospital course and recuperation has been discussed with the pt as well as the potential risks of surgery including:  Bleeding, risk of blood transfusion, infection, need for drainage of infection, ileus, anastomotic leak, need for reoperation, permanent stoma creation and sexual dysfunction     Procedure note    Flexible sigmoidoscopy    Verbal consent obtained.     Indications:  Low rectal cancer status " post long course neoadjuvant therapy    Post procedure diagnosis:  Same    Procedure:  Flexible sigmoidoscopy    Surgeon LANDRY    Asst:  Oscar Ngo MD    Findings:    1.  Low mass 1 cm above anorectal ring  2.  Good resting tone, good squeeze      Technique in detail:  Timeout performed.  Pt placed in left lateral Rae position.  Lubrication with digital rectal exam revealed normal tone,  palpable mass above anorectal ring.  The endoscope was lubricated and the tip inserted into the anal canal.  The endoscope was advanced under direct vision to 40 cm.  Upon withdrawal the mucosa was meticulously inspected.  The pt tolerated the procedure well     Complications:  None    EBL:  None    Patient discharged from the office in stable condition

## 2018-12-12 ENCOUNTER — ANESTHESIA (OUTPATIENT)
Dept: SURGERY | Facility: HOSPITAL | Age: 63
End: 2018-12-12

## 2018-12-12 ENCOUNTER — HOSPITAL ENCOUNTER (OUTPATIENT)
Facility: HOSPITAL | Age: 63
Discharge: HOME | End: 2018-12-12
Attending: COLON & RECTAL SURGERY | Admitting: COLON & RECTAL SURGERY

## 2018-12-12 ENCOUNTER — DOCUMENTATION ONLY (OUTPATIENT)
Dept: SURGERY | Facility: HOSPITAL | Age: 63
End: 2018-12-12

## 2018-12-12 ENCOUNTER — ANESTHESIA (OUTPATIENT)
Dept: ENDOSCOPY | Facility: HOSPITAL | Age: 63
End: 2018-12-12

## 2018-12-12 ENCOUNTER — OFFICE VISIT (OUTPATIENT)
Dept: SURGERY | Facility: CLINIC | Age: 63
End: 2018-12-12

## 2018-12-12 VITALS
WEIGHT: 116 LBS | OXYGEN SATURATION: 99 % | TEMPERATURE: 98 F | BODY MASS INDEX: 18.21 KG/M2 | RESPIRATION RATE: 16 BRPM | DIASTOLIC BLOOD PRESSURE: 69 MMHG | HEART RATE: 90 BPM | HEIGHT: 67 IN | SYSTOLIC BLOOD PRESSURE: 135 MMHG

## 2018-12-12 VITALS
HEART RATE: 79 BPM | WEIGHT: 116 LBS | DIASTOLIC BLOOD PRESSURE: 60 MMHG | BODY MASS INDEX: 18.21 KG/M2 | SYSTOLIC BLOOD PRESSURE: 132 MMHG | HEIGHT: 67 IN

## 2018-12-12 DIAGNOSIS — C20 RECTAL CANCER: Primary | ICD-10-CM

## 2018-12-12 DIAGNOSIS — Z12.11 SCREENING FOR COLON CANCER: ICD-10-CM

## 2018-12-12 PROCEDURE — 37000008 HC ANESTHESIA 1ST 15 MINUTES: Performed by: COLON & RECTAL SURGERY

## 2018-12-12 PROCEDURE — 99214 OFFICE O/P EST MOD 30 MIN: CPT | Mod: S$PBB,25,, | Performed by: COLON & RECTAL SURGERY

## 2018-12-12 PROCEDURE — 37000009 HC ANESTHESIA EA ADD 15 MINS: Performed by: COLON & RECTAL SURGERY

## 2018-12-12 PROCEDURE — 45330 DIAGNOSTIC SIGMOIDOSCOPY: CPT | Mod: S$PBB,,, | Performed by: COLON & RECTAL SURGERY

## 2018-12-12 PROCEDURE — 63600175 PHARM REV CODE 636 W HCPCS: Performed by: NURSE ANESTHETIST, CERTIFIED REGISTERED

## 2018-12-12 PROCEDURE — 88305 TISSUE EXAM BY PATHOLOGIST: CPT | Mod: 26,,, | Performed by: PATHOLOGY

## 2018-12-12 PROCEDURE — 99214 OFFICE O/P EST MOD 30 MIN: CPT | Mod: PBBFAC,25 | Performed by: COLON & RECTAL SURGERY

## 2018-12-12 PROCEDURE — 99999 PR PBB SHADOW E&M-EST. PATIENT-LVL IV: CPT | Mod: PBBFAC,,, | Performed by: COLON & RECTAL SURGERY

## 2018-12-12 PROCEDURE — 25000003 PHARM REV CODE 250: Performed by: NURSE PRACTITIONER

## 2018-12-12 PROCEDURE — 45384 COLONOSCOPY W/LESION REMOVAL: CPT | Mod: 33,,, | Performed by: COLON & RECTAL SURGERY

## 2018-12-12 PROCEDURE — 88305 TISSUE EXAM BY PATHOLOGIST: CPT | Performed by: PATHOLOGY

## 2018-12-12 PROCEDURE — 45330 DIAGNOSTIC SIGMOIDOSCOPY: CPT | Mod: 25,PBBFAC | Performed by: COLON & RECTAL SURGERY

## 2018-12-12 PROCEDURE — E9220 PRA ENDO ANESTHESIA: HCPCS | Mod: ,,, | Performed by: NURSE ANESTHETIST, CERTIFIED REGISTERED

## 2018-12-12 PROCEDURE — 45384 COLONOSCOPY W/LESION REMOVAL: CPT | Performed by: COLON & RECTAL SURGERY

## 2018-12-12 PROCEDURE — 27201012 HC FORCEPS, HOT/COLD, DISP: Performed by: COLON & RECTAL SURGERY

## 2018-12-12 PROCEDURE — 25000003 PHARM REV CODE 250: Performed by: NURSE ANESTHETIST, CERTIFIED REGISTERED

## 2018-12-12 RX ORDER — HEPARIN SODIUM 5000 [USP'U]/ML
5000 INJECTION, SOLUTION INTRAVENOUS; SUBCUTANEOUS
Status: CANCELLED | OUTPATIENT
Start: 2018-12-12

## 2018-12-12 RX ORDER — PROPOFOL 10 MG/ML
VIAL (ML) INTRAVENOUS
Status: DISCONTINUED | OUTPATIENT
Start: 2018-12-12 | End: 2018-12-12

## 2018-12-12 RX ORDER — PHENYLEPHRINE HYDROCHLORIDE 10 MG/ML
INJECTION INTRAVENOUS
Status: DISCONTINUED | OUTPATIENT
Start: 2018-12-12 | End: 2018-12-12

## 2018-12-12 RX ORDER — FENTANYL CITRATE 50 UG/ML
INJECTION, SOLUTION INTRAMUSCULAR; INTRAVENOUS
Status: DISCONTINUED | OUTPATIENT
Start: 2018-12-12 | End: 2018-12-12

## 2018-12-12 RX ORDER — METRONIDAZOLE 500 MG/1
500 TABLET ORAL SEE ADMIN INSTRUCTIONS
Qty: 3 TABLET | Refills: 0 | Status: ON HOLD | OUTPATIENT
Start: 2018-12-12 | End: 2018-12-20

## 2018-12-12 RX ORDER — GLYCOPYRROLATE 0.2 MG/ML
INJECTION INTRAMUSCULAR; INTRAVENOUS
Status: DISCONTINUED | OUTPATIENT
Start: 2018-12-12 | End: 2018-12-12

## 2018-12-12 RX ORDER — MUPIROCIN 20 MG/G
OINTMENT TOPICAL
Status: CANCELLED | OUTPATIENT
Start: 2018-12-12

## 2018-12-12 RX ORDER — LIDOCAINE HCL/PF 100 MG/5ML
SYRINGE (ML) INTRAVENOUS
Status: DISCONTINUED | OUTPATIENT
Start: 2018-12-12 | End: 2018-12-12

## 2018-12-12 RX ORDER — SODIUM CHLORIDE 9 MG/ML
INJECTION, SOLUTION INTRAVENOUS CONTINUOUS
Status: CANCELLED | OUTPATIENT
Start: 2018-12-12

## 2018-12-12 RX ORDER — SODIUM CHLORIDE 9 MG/ML
INJECTION, SOLUTION INTRAVENOUS CONTINUOUS
Status: ACTIVE | OUTPATIENT
Start: 2018-12-12

## 2018-12-12 RX ORDER — PROPOFOL 10 MG/ML
VIAL (ML) INTRAVENOUS CONTINUOUS PRN
Status: DISCONTINUED | OUTPATIENT
Start: 2018-12-12 | End: 2018-12-12

## 2018-12-12 RX ORDER — LIDOCAINE HYDROCHLORIDE 10 MG/ML
1 INJECTION, SOLUTION EPIDURAL; INFILTRATION; INTRACAUDAL; PERINEURAL ONCE
Status: CANCELLED | OUTPATIENT
Start: 2018-12-12 | End: 2018-12-12

## 2018-12-12 RX ORDER — SODIUM CHLORIDE 0.9 % (FLUSH) 0.9 %
3 SYRINGE (ML) INJECTION
Status: ACTIVE | OUTPATIENT
Start: 2018-12-12

## 2018-12-12 RX ORDER — ACETAMINOPHEN 10 MG/ML
1000 INJECTION, SOLUTION INTRAVENOUS
Status: CANCELLED | OUTPATIENT
Start: 2018-12-12 | End: 2018-12-12

## 2018-12-12 RX ORDER — NEOMYCIN SULFATE 500 MG/1
500 TABLET ORAL SEE ADMIN INSTRUCTIONS
Qty: 6 TABLET | Refills: 0 | Status: ON HOLD | OUTPATIENT
Start: 2018-12-12 | End: 2018-12-20

## 2018-12-12 RX ORDER — METRONIDAZOLE 500 MG/100ML
500 INJECTION, SOLUTION INTRAVENOUS
Status: CANCELLED | OUTPATIENT
Start: 2018-12-12

## 2018-12-12 RX ADMIN — PROPOFOL 80 MG: 10 INJECTION, EMULSION INTRAVENOUS at 11:12

## 2018-12-12 RX ADMIN — PHENYLEPHRINE HYDROCHLORIDE 100 MCG: 10 INJECTION INTRAVENOUS at 12:12

## 2018-12-12 RX ADMIN — GLYCOPYRROLATE 0.1 MG: 0.2 INJECTION, SOLUTION INTRAMUSCULAR; INTRAVENOUS at 12:12

## 2018-12-12 RX ADMIN — PROPOFOL 50 MG: 10 INJECTION, EMULSION INTRAVENOUS at 12:12

## 2018-12-12 RX ADMIN — SODIUM CHLORIDE: 0.9 INJECTION, SOLUTION INTRAVENOUS at 11:12

## 2018-12-12 RX ADMIN — GLYCOPYRROLATE 0.1 MG: 0.2 INJECTION, SOLUTION INTRAMUSCULAR; INTRAVENOUS at 11:12

## 2018-12-12 RX ADMIN — FENTANYL CITRATE 25 MCG: 50 INJECTION, SOLUTION INTRAMUSCULAR; INTRAVENOUS at 12:12

## 2018-12-12 RX ADMIN — LIDOCAINE HYDROCHLORIDE 80 MG: 20 INJECTION, SOLUTION INTRAVENOUS at 11:12

## 2018-12-12 RX ADMIN — PROPOFOL 100 MCG/KG/MIN: 10 INJECTION, EMULSION INTRAVENOUS at 11:12

## 2018-12-12 RX ADMIN — SODIUM CHLORIDE: 0.9 INJECTION, SOLUTION INTRAVENOUS at 12:12

## 2018-12-12 NOTE — PLAN OF CARE
D/C instructions given. Pt V/U. Pt refused W/C. Ambulated with family to appointment in Clinic with DR. Pizarro. . Steady gait. No c/o. No distress noted.

## 2018-12-12 NOTE — ANESTHESIA PREPROCEDURE EVALUATION
12/12/2018  Susannah Xie is a 63 y.o., female.    Patient Active Problem List   Diagnosis    COPD (chronic obstructive pulmonary disease)    Rectal cancer    Sinus problem    Tobacco abuse    Atypical chest pain    Acid reflux    Postural dizziness    Urinary hesitancy    Fever    Palpable abdominal aorta         Anesthesia Evaluation    I have reviewed the Patient Summary Reports.    I have reviewed the Nursing Notes.   I have reviewed the Medications.     Review of Systems  Social:  Smoker    Hematology/Oncology:  Hematology Normal   Oncology Normal     EENT/Dental:EENT/Dental Normal   Cardiovascular:  Cardiovascular Normal     Pulmonary:   COPD    Renal/:  Renal/ Normal     Hepatic/GI:   GERD    Musculoskeletal:  Musculoskeletal Normal    Neurological:  Neurology Normal    Endocrine:  Endocrine Normal    Dermatological:  Skin Normal    Psych:  Psychiatric Normal           Physical Exam  General:  Well nourished    Airway/Jaw/Neck:  Airway Findings: Mouth Opening: Normal Tongue: Normal  General Airway Assessment: Adult  Mallampati: II  TM Distance: Normal, at least 6 cm      Dental:  Dental Findings: In tact   Chest/Lungs:  Chest/Lungs Findings: Clear to auscultation, Normal Respiratory Rate     Heart/Vascular:  Heart Findings: Rate: Normal  Rhythm: Regular Rhythm  Sounds: Normal             Anesthesia Plan  Type of Anesthesia, risks & benefits discussed:  Anesthesia Type:  general  Patient's Preference:   Intra-op Monitoring Plan: standard ASA monitors  Intra-op Monitoring Plan Comments:   Post Op Pain Control Plan:   Post Op Pain Control Plan Comments:   Induction:   IV  Beta Blocker:  Patient is not currently on a Beta-Blocker (No further documentation required).       Informed Consent: Patient understands risks and agrees with Anesthesia plan.  Questions answered. Anesthesia consent signed  with patient.  ASA Score: 2     Day of Surgery Review of History & Physical:    H&P update referred to the provider.         Ready For Surgery From Anesthesia Perspective.

## 2018-12-12 NOTE — H&P
Colonoscopy History and Physical      Procedure : Colonoscopy    Indications:  Rectal cancer with cecal TVA    Family Hx of CRC: denies    Last Colonoscopy:  7/24/18    Hx of sedation problems: none  FHX of sedation problems: none    Past Medical History:   Diagnosis Date    Cancer     COPD (chronic obstructive pulmonary disease) with chronic bronchitis     GERD (gastroesophageal reflux disease)     Tobacco abuse        Past Surgical History:   Procedure Laterality Date    APPENDECTOMY      PORTACATH PLACEMENT Left     chest    TOTAL ABDOMINAL HYSTERECTOMY W/ BILATERAL SALPINGOOPHORECTOMY         Review of patient's allergies indicates:   Allergen Reactions    Ceclor [cefaclor] Anaphylaxis       No current facility-administered medications on file prior to encounter.      Current Outpatient Medications on File Prior to Encounter   Medication Sig Dispense Refill    albuterol (VENTOLIN HFA) 90 mcg/actuation inhaler Inhale 2 puffs into the lungs 2 (two) times daily as needed for Wheezing. Rescue      Bifidobacterium infantis (ALIGN ORAL) Take 1 capsule by mouth every morning.      calcium carbonate (TUMS) 200 mg calcium (500 mg) chewable tablet Take 1 tablet by mouth daily as needed.       esomeprazole (NEXIUM) 20 mg GrPS Take 20 mg by mouth 2 (two) times daily.      cholecalciferol, vitamin D3, (VITAMIN D3) 5,000 unit Tab Take 5,000 Units by mouth once daily.      ibuprofen (ADVIL,MOTRIN) 200 MG tablet Take 200 mg by mouth every 6 (six) hours as needed.       loperamide (IMODIUM A-D) 2 mg Tab Take 2 mg by mouth 4 (four) times daily as needed.      oxyCODONE (OXY-IR) 5 mg Cap Take 5 mg by mouth every 6 (six) hours as needed for Pain.      traMADol (ULTRAM) 50 mg tablet Take 50 mg by mouth every 6 (six) hours as needed for Pain.         Family History   Problem Relation Age of Onset    Diabetes Mother     Lung cancer Father     Heart disease Brother 53       Social History     Socioeconomic History     Marital status:      Spouse name: Not on file    Number of children: Not on file    Years of education: Not on file    Highest education level: Not on file   Social Needs    Financial resource strain: Not on file    Food insecurity - worry: Not on file    Food insecurity - inability: Not on file    Transportation needs - medical: Not on file    Transportation needs - non-medical: Not on file   Occupational History    Not on file   Tobacco Use    Smoking status: Current Every Day Smoker     Packs/day: 1.50     Years: 30.00     Pack years: 45.00     Types: Cigarettes    Smokeless tobacco: Never Used   Substance and Sexual Activity    Alcohol use: Yes     Frequency: Monthly or less     Drinks per session: 1 or 2     Binge frequency: Never     Comment: seldom    Drug use: No    Sexual activity: Not on file   Other Topics Concern    Not on file   Social History Narrative    Not on file       Review of Systems -   Respiratory ROS: negative  Cardiovascular ROS: negative  Gastrointestinal ROS: negative  Musculoskeletal ROS: negative  Neurological ROS: negative    Physical Exam:  General: no distress  Head: normocephalic  Oropharynx clear, Mallampati   Lungs:  normal respiratory effort  Heart: regular rate  Abdomen: soft,  Non-tender  Extremities: warm and well perfused  Neuro awake and alert    ASA: II    Patient cleared for Anesthesia:  MAC    Anesthesia/Surgery risks, benefits, and alternative options discussed and understood by patient/family.

## 2018-12-12 NOTE — ANESTHESIA POSTPROCEDURE EVALUATION
"Anesthesia Post Evaluation    Patient: Susannah Xie    Procedure(s) Performed: Procedure(s) (LRB):  COLONOSCOPY (N/A)    Final Anesthesia Type: general  Patient location during evaluation: GI PACU  Patient participation: Yes- Able to Participate  Level of consciousness: awake and alert  Post-procedure vital signs: reviewed and stable  Pain management: adequate  Airway patency: patent  PONV status at discharge: No PONV  Anesthetic complications: no      Cardiovascular status: blood pressure returned to baseline  Respiratory status: spontaneous ventilation  Hydration status: euvolemic  Follow-up not needed.        Visit Vitals  /69 (BP Location: Left arm, Patient Position: Lying)   Pulse 90   Temp 36.7 °C (98.1 °F) (Temporal)   Resp 16   Ht 5' 7" (1.702 m)   Wt 52.6 kg (116 lb)   SpO2 99%   Breastfeeding? No   BMI 18.17 kg/m²       Pain/Kiran Score: Kiran Score: 10 (12/12/2018 12:56 PM)        "

## 2018-12-12 NOTE — PROGRESS NOTES
Multidisciplinary Rectal Cancer Conference - Evaluation and Recommendation Summary  12/12/2018  Ssuannah Xie  [id number]  63 y.o. female    1. Evaluation    MRI date: 08/20/2018    Tumor Location in Rectum: lower third    Indication of Sphincter Involvement:  Uninvolved    Pretreatment Circumferential Resection Margin (CRM) status:  Threatened    Pretreatment (clinical) AJCC Stage: IIIB  Stage I  [] I: T1N0M0  [] I: T2N0M0  Stage II  [] IIA: T3N0M0  [] IIB F6yW1J1  [] IIC: F9lT6R8  Stage III  [] IIIA: T1-2N1M0  [] IIIA: C1N4kU1  [] IIIB: T3-W9kE4W5  [x] IIIB: T2-3N2aM0  [] IIIB: T1-2N2bM0  [] IIIC: C8eU3oE9  [] IIIC: T3-3zE7oZ6  [] IIIC: B0lE4-9V6   Stage IV  [] IV: K7-4Z1-4J7y-b    CEA level:   Lab Results   Component Value Date    CEA 2.8 11/27/2018        2. Treatment Recommendation    Completed neoadjuvant chemoradiation    Post-treatment MRI shows threatened posterior CRM    Plan for LAR, DLI in Jan 2019 with Dr. Benavides

## 2018-12-12 NOTE — [employer] PATIENT INSTRUCTIONS
Discharge Summary/Instructions after an Endoscopic Procedure  Patient Name: Susannah Xie  Patient MRN: [id number]  Patient YOB: 1955  Wednesday, December 12, 2018  Jeyson Pizarro MD  RESTRICTIONS:  During your procedure today, you received medications for sedation.  These   medications may affect your judgment, balance and coordination.  Therefore,   for 24 hours, you have the following restrictions:   - DO NOT drive a car, operate machinery, make legal/financial decisions,   sign important papers or drink alcohol.    ACTIVITY:  Today: no heavy lifting, straining or running due to procedural   sedation/anesthesia.  The following day: return to full activity including work.  DIET:  Eat and drink normally unless instructed otherwise.     TREATMENT FOR COMMON SIDE EFFECTS:  - Mild abdominal pain, nausea, belching, bloating or excessive gas:  rest,   eat lightly and use a heating pad.  - Sore Throat: treat with throat lozenges and/or gargle with warm salt   water.  - Because air was used during the procedure, expelling large amounts of air   from your rectum or belching is normal.  - If a bowel prep was taken, you may not have a bowel movement for 1-3 days.    This is normal.  SYMPTOMS TO WATCH FOR AND REPORT TO YOUR PHYSICIAN:  1. Abdominal pain or bloating, other than gas cramps.  2. Chest pain.  3. Back pain.  4. Signs of infection such as: chills or fever occurring within 24 hours   after the procedure.  5. Rectal bleeding, which would show as bright red, maroon, or black stools.   (A tablespoon of blood from the rectum is not serious, especially if   hemorrhoids are present.)  6. Vomiting.  7. Weakness or dizziness.  GO DIRECTLY TO THE NEAREST EMERGENCY ROOM IF YOU HAVE ANY OF THE FOLLOWING:      Difficulty breathing              Chills and/or fever over 101 F   Persistent vomiting and/or vomiting blood   Severe abdominal pain   Severe chest pain   Black, tarry stools   Bleeding- more than one  tablespoon   Any other symptom or condition that you feel may need urgent attention  Your doctor recommends these additional instructions:  If any biopsies were taken, your doctors clinic will contact you in 1 to 2   weeks with any results.  - Discharge patient to home (ambulatory).   - Refer to a surgeon today.   - Repeat colonoscopy is recommended.  The colonoscopy date will be   determined after pathology results from today's exam become available for   review.  For questions, problems or results please call your physician - Jeyson Pizarro MD at Work:  [phone].  [facility], EMERGENCY ROOM PHONE NUMBER: [phone]  IF A COMPLICATION OR EMERGENCY SITUATION ARISES AND YOU ARE UNABLE TO REACH   YOUR PHYSICIAN - GO DIRECTLY TO THE EMERGENCY ROOM.  Jeyson Pizarro MD  12/12/2018 12:25:20 PM  This report has been verified and signed electronically.  [employer]

## 2018-12-18 ENCOUNTER — TELEPHONE (OUTPATIENT)
Dept: SURGERY | Facility: CLINIC | Age: 63
End: 2018-12-18

## 2018-12-18 NOTE — TELEPHONE ENCOUNTER
----- Message from Kelsey Winkler sent at 12/18/2018  2:18 PM CST -----  Contact: pt#[phone]  Needs Advice    Reason for call:Pt wants to speak with Radha but she did not want to leave details         Communication Preference:call    Additional Information:

## 2018-12-18 NOTE — TELEPHONE ENCOUNTER
----- Message from Kelsey Winkler sent at 12/18/2018  3:58 PM CST -----  Contact: pt#[phone]  Needs Advice    Reason for call:Pt states that she wants to speak with Lianet but she did not want to leave detail         Communication Preference:call    Additional Information:

## 2018-12-18 NOTE — TELEPHONE ENCOUNTER
Returned call. Spoke to patient. Reviewed full prep instructions with patient. She verbalized understanding of information provided.

## 2018-12-19 ENCOUNTER — TELEPHONE (OUTPATIENT)
Dept: ENDOSCOPY | Facility: HOSPITAL | Age: 63
End: 2018-12-19

## 2018-12-19 ENCOUNTER — ANESTHESIA EVENT (OUTPATIENT)
Dept: SURGERY | Facility: HOSPITAL | Age: 63
DRG: 330 | End: 2018-12-19

## 2018-12-19 NOTE — ANESTHESIA PREPROCEDURE EVALUATION
[facility]  Anesthesia Pre-Operative Evaluation         Patient Name: Susannah Xie  YOB: 1955  MRN: [id number]    SUBJECTIVE:     Pre-operative evaluation for Procedure(s) (LRB):  COLECTOMY, WITH COLOANAL ANASTOMOSIS (N/A)  CREATION, ILEOSTOMY (N/A)     12/19/2018    Susannah Xie is a 63 y.o. female w/ a significant PMHx of COPD, GERD, tobacco abuse, low rectal adenocarcinoma s/p neoadjuvant therapy (finished Oct 2018).     Patient now presents for the above procedure(s).    Prev airway: None documented          Patient Active Problem List   Diagnosis    COPD (chronic obstructive pulmonary disease)    Rectal cancer    Sinus problem    Tobacco abuse    Atypical chest pain    Acid reflux    Postural dizziness    Urinary hesitancy    Fever    Palpable abdominal aorta    Screening for colon cancer       Review of patient's allergies indicates:   Allergen Reactions    Ceclor [cefaclor] Anaphylaxis       Current Inpatient Medications:      Current Facility-Administered Medications on File Prior to Encounter   Medication Dose Route Frequency Provider Last Rate Last Dose    0.9%  NaCl infusion   Intravenous Continuous Janina New NP   Stopped at 12/12/18 1227    sodium chloride 0.9% flush 3 mL  3 mL Intravenous PRN Janina New NP         Current Outpatient Medications on File Prior to Encounter   Medication Sig Dispense Refill    albuterol (VENTOLIN HFA) 90 mcg/actuation inhaler Inhale 2 puffs into the lungs 2 (two) times daily as needed for Wheezing. Rescue      Bifidobacterium infantis (ALIGN ORAL) Take 1 capsule by mouth every morning.      esomeprazole (NEXIUM) 20 mg GrPS Take 20 mg by mouth 2 (two) times daily.      ibuprofen (ADVIL,MOTRIN) 200 MG tablet Take 200 mg by mouth every 6 (six) hours as needed.       loperamide (IMODIUM A-D) 2 mg Tab Take  2 mg by mouth 4 (four) times daily as needed.      metroNIDAZOLE (FLAGYL) 500 MG tablet Take 1 tablet (500 mg total) by mouth As instructed. Take one pill at 1pm, one pill 2pm, one pill at 11pm 3 tablet 0    neomycin (MYCIFRADIN) 500 mg Tab Take 1 tablet (500 mg total) by mouth As instructed. Take 2 pills at 1pm, 2 pills at 2pm, and 2 pills at 11pm 6 tablet 0       Past Surgical History:   Procedure Laterality Date    APPENDECTOMY      COLONOSCOPY N/A 12/12/2018    Procedure: COLONOSCOPY;  Surgeon: Jeyson Pizarro MD;  [facility]  Service: Endoscopy;  Laterality: N/A;    COLONOSCOPY N/A 12/12/2018    Performed by Jeyson Pizarro MD at [facility]    PORTACATH PLACEMENT Left     chest    TOTAL ABDOMINAL HYSTERECTOMY W/ BILATERAL SALPINGOOPHORECTOMY         Social History     Socioeconomic History    Marital status:      Spouse name: Not on file    Number of children: Not on file    Years of education: Not on file    Highest education level: Not on file   Social Needs    Financial resource strain: Not on file    Food insecurity - worry: Not on file    Food insecurity - inability: Not on file    Transportation needs - medical: Not on file    Transportation needs - non-medical: Not on file   Occupational History    Not on file   Tobacco Use    Smoking status: Current Every Day Smoker     Packs/day: 1.50     Years: 30.00     Pack years: 45.00     Types: Cigarettes    Smokeless tobacco: Never Used   Substance and Sexual Activity    Alcohol use: Yes     Frequency: Monthly or less     Drinks per session: 1 or 2     Binge frequency: Never     Comment: seldom    Drug use: No    Sexual activity: Not on file   Other Topics Concern    Not on file   Social History Narrative    Not on file       OBJECTIVE:     Vital Signs Range (Last 24H):         Significant Labs:  Lab Results   Component Value Date    WBC 4.97 11/27/2018    HGB 12.8 11/27/2018    HCT 41.0 11/27/2018    PLT  270 11/27/2018    ALT <5 (L) 11/27/2018    AST 10 11/27/2018     11/27/2018    K 4.0 11/27/2018     11/27/2018    CREATININE 0.6 11/27/2018    CREATININE 0.6 11/27/2018    BUN 8 11/27/2018    CO2 26 11/27/2018       Diagnostic Studies: No relevant studies.    EKG:   Vent. Rate : 089 BPM     Atrial Rate : 089 BPM     P-R Int : 144 ms          QRS Dur : 094 ms      QT Int : 362 ms       P-R-T Axes : 078 079 063 degrees     QTc Int : 440 ms    Normal sinus rhythm  Normal ECG  No previous ECGs available  Confirmed by SHREYA GOODSON MD ([id number]) on 11/27/2018 4:18:03 PM    Referred By: JADA ALATORRE           Confirmed By:SHREYA GOODSON MD    2D ECHO:  No results found for this or any previous visit.      ASSESSMENT/PLAN:         Anesthesia Evaluation    I have reviewed the Patient Summary Reports.    I have reviewed the Nursing Notes.   I have reviewed the Medications.     Review of Systems  Anesthesia Hx:  History of prior surgery of interest to airway management or planning: Denies Family Hx of Anesthesia complications.   Denies Personal Hx of Anesthesia complications.   Social:  Smoker    Cardiovascular:  Cardiovascular Normal Exercise tolerance: good  Denies Hypertension.     Pulmonary:   COPD    Hepatic/GI:   GERD    Neurological:  Neurology Normal    Endocrine:  Endocrine Normal           Anesthesia Plan  Type of Anesthesia, risks & benefits discussed:  Anesthesia Type:  general  Patient's Preference:   Intra-op Monitoring Plan: standard ASA monitors  Intra-op Monitoring Plan Comments:   Post Op Pain Control Plan: multimodal analgesia, IV/PO Opioids PRN and per primary service following discharge from PACU  Post Op Pain Control Plan Comments:   Induction:   IV  Beta Blocker:  Patient is not currently on a Beta-Blocker (No further documentation required).       Informed Consent: Patient understands risks and agrees with Anesthesia plan.  Questions answered.   ASA Score: 3     Day of Surgery Review of History &  Physical:            Ready For Surgery From Anesthesia Perspective.

## 2018-12-20 ENCOUNTER — TELEPHONE (OUTPATIENT)
Dept: SURGERY | Facility: CLINIC | Age: 63
End: 2018-12-20

## 2018-12-20 ENCOUNTER — HOSPITAL ENCOUNTER (INPATIENT)
Facility: HOSPITAL | Age: 63
LOS: 3 days | Discharge: HOME | DRG: 330 | End: 2018-12-23
Attending: COLON & RECTAL SURGERY | Admitting: COLON & RECTAL SURGERY

## 2018-12-20 ENCOUNTER — ANESTHESIA (OUTPATIENT)
Dept: SURGERY | Facility: HOSPITAL | Age: 63
DRG: 330 | End: 2018-12-20

## 2018-12-20 DIAGNOSIS — C20 RECTAL CANCER: ICD-10-CM

## 2018-12-20 DIAGNOSIS — E83.42 HYPOMAGNESEMIA: ICD-10-CM

## 2018-12-20 DIAGNOSIS — E87.6 HYPOKALEMIA: Primary | ICD-10-CM

## 2018-12-20 LAB
ABO + RH BLD: NORMAL
BLD GP AB SCN CELLS X3 SERPL QL: NORMAL

## 2018-12-20 PROCEDURE — 88309 TISSUE SPECIMEN TO PATHOLOGY - SURGERY: ICD-10-PCS | Mod: 26,,, | Performed by: PATHOLOGY

## 2018-12-20 PROCEDURE — 88305 TISSUE EXAM BY PATHOLOGIST: CPT | Mod: 26,,, | Performed by: PATHOLOGY

## 2018-12-20 PROCEDURE — 88331 PATH CONSLTJ SURG 1 BLK 1SPC: CPT | Mod: 26,,, | Performed by: PATHOLOGY

## 2018-12-20 PROCEDURE — C1765 ADHESION BARRIER: HCPCS | Performed by: COLON & RECTAL SURGERY

## 2018-12-20 PROCEDURE — 25000003 PHARM REV CODE 250: Performed by: NURSE ANESTHETIST, CERTIFIED REGISTERED

## 2018-12-20 PROCEDURE — 71000033 HC RECOVERY, INTIAL HOUR: Performed by: COLON & RECTAL SURGERY

## 2018-12-20 PROCEDURE — 45119 REMOVE RECTUM W/RESERVOIR: CPT | Mod: ,,, | Performed by: COLON & RECTAL SURGERY

## 2018-12-20 PROCEDURE — 94761 N-INVAS EAR/PLS OXIMETRY MLT: CPT

## 2018-12-20 PROCEDURE — 25000003 PHARM REV CODE 250: Performed by: NURSE PRACTITIONER

## 2018-12-20 PROCEDURE — 27201423 OPTIME MED/SURG SUP & DEVICES STERILE SUPPLY: Performed by: COLON & RECTAL SURGERY

## 2018-12-20 PROCEDURE — P9045 ALBUMIN (HUMAN), 5%, 250 ML: HCPCS | Mod: JG | Performed by: NURSE ANESTHETIST, CERTIFIED REGISTERED

## 2018-12-20 PROCEDURE — 88331 TISSUE SPECIMEN TO PATHOLOGY - SURGERY: ICD-10-PCS | Mod: 26,,, | Performed by: PATHOLOGY

## 2018-12-20 PROCEDURE — 37000008 HC ANESTHESIA 1ST 15 MINUTES: Performed by: COLON & RECTAL SURGERY

## 2018-12-20 PROCEDURE — 25000003 PHARM REV CODE 250: Performed by: STUDENT IN AN ORGANIZED HEALTH CARE EDUCATION/TRAINING PROGRAM

## 2018-12-20 PROCEDURE — 71000039 HC RECOVERY, EACH ADD'L HOUR: Performed by: COLON & RECTAL SURGERY

## 2018-12-20 PROCEDURE — 36000708 HC OR TIME LEV III 1ST 15 MIN: Performed by: COLON & RECTAL SURGERY

## 2018-12-20 PROCEDURE — C9290 INJ, BUPIVACAINE LIPOSOME: HCPCS | Performed by: COLON & RECTAL SURGERY

## 2018-12-20 PROCEDURE — 20600001 HC STEP DOWN PRIVATE ROOM

## 2018-12-20 PROCEDURE — 63600175 PHARM REV CODE 636 W HCPCS: Performed by: STUDENT IN AN ORGANIZED HEALTH CARE EDUCATION/TRAINING PROGRAM

## 2018-12-20 PROCEDURE — 37000009 HC ANESTHESIA EA ADD 15 MINS: Performed by: COLON & RECTAL SURGERY

## 2018-12-20 PROCEDURE — 63600175 PHARM REV CODE 636 W HCPCS

## 2018-12-20 PROCEDURE — 88305 TISSUE SPECIMEN TO PATHOLOGY - SURGERY: ICD-10-PCS | Mod: 26,,, | Performed by: PATHOLOGY

## 2018-12-20 PROCEDURE — 88309 TISSUE EXAM BY PATHOLOGIST: CPT | Mod: 26,,, | Performed by: PATHOLOGY

## 2018-12-20 PROCEDURE — 63600175 PHARM REV CODE 636 W HCPCS: Performed by: NURSE ANESTHETIST, CERTIFIED REGISTERED

## 2018-12-20 PROCEDURE — 88331 PATH CONSLTJ SURG 1 BLK 1SPC: CPT | Performed by: PATHOLOGY

## 2018-12-20 PROCEDURE — 49905 PR OMENTAL FLAP,INTRA-ABDOMINAL: ICD-10-PCS | Mod: ,,, | Performed by: COLON & RECTAL SURGERY

## 2018-12-20 PROCEDURE — 25000003 PHARM REV CODE 250: Performed by: COLON & RECTAL SURGERY

## 2018-12-20 PROCEDURE — D9220A PRA ANESTHESIA: Mod: ,,, | Performed by: ANESTHESIOLOGY

## 2018-12-20 PROCEDURE — 63600175 PHARM REV CODE 636 W HCPCS: Performed by: NURSE PRACTITIONER

## 2018-12-20 PROCEDURE — 63600175 PHARM REV CODE 636 W HCPCS: Performed by: COLON & RECTAL SURGERY

## 2018-12-20 PROCEDURE — 49905 OMENTAL FLAP INTRA-ABDOM: CPT | Mod: ,,, | Performed by: COLON & RECTAL SURGERY

## 2018-12-20 PROCEDURE — 45119 PR PROCTECTOMY,A-P PULLTHRU W/RESERVOIR: ICD-10-PCS | Mod: ,,, | Performed by: COLON & RECTAL SURGERY

## 2018-12-20 PROCEDURE — 86901 BLOOD TYPING SEROLOGIC RH(D): CPT

## 2018-12-20 PROCEDURE — S0020 INJECTION, BUPIVICAINE HYDRO: HCPCS | Performed by: COLON & RECTAL SURGERY

## 2018-12-20 PROCEDURE — S0030 INJECTION, METRONIDAZOLE: HCPCS | Performed by: NURSE PRACTITIONER

## 2018-12-20 PROCEDURE — 88305 TISSUE EXAM BY PATHOLOGIST: CPT | Performed by: PATHOLOGY

## 2018-12-20 PROCEDURE — 27000221 HC OXYGEN, UP TO 24 HOURS

## 2018-12-20 PROCEDURE — 36000709 HC OR TIME LEV III EA ADD 15 MIN: Performed by: COLON & RECTAL SURGERY

## 2018-12-20 PROCEDURE — 63600175 PHARM REV CODE 636 W HCPCS: Mod: JG | Performed by: NURSE ANESTHETIST, CERTIFIED REGISTERED

## 2018-12-20 DEVICE — BARRIER SEPRAFILM ADHESION: Type: IMPLANTABLE DEVICE | Site: ABDOMEN | Status: FUNCTIONAL

## 2018-12-20 RX ORDER — HEPARIN SODIUM 5000 [USP'U]/ML
5000 INJECTION, SOLUTION INTRAVENOUS; SUBCUTANEOUS
Status: COMPLETED | OUTPATIENT
Start: 2018-12-20 | End: 2018-12-20

## 2018-12-20 RX ORDER — ONDANSETRON 2 MG/ML
4 INJECTION INTRAMUSCULAR; INTRAVENOUS EVERY 12 HOURS PRN
Status: DISCONTINUED | OUTPATIENT
Start: 2018-12-20 | End: 2018-12-23 | Stop reason: HOSPADM

## 2018-12-20 RX ORDER — PANTOPRAZOLE SODIUM 40 MG/1
40 FOR SUSPENSION ORAL DAILY
Status: CANCELLED | OUTPATIENT
Start: 2018-12-21

## 2018-12-20 RX ORDER — NALOXONE HCL 0.4 MG/ML
0.02 VIAL (ML) INJECTION
Status: DISCONTINUED | OUTPATIENT
Start: 2018-12-20 | End: 2018-12-23 | Stop reason: HOSPADM

## 2018-12-20 RX ORDER — PHENYLEPHRINE HYDROCHLORIDE 10 MG/ML
INJECTION INTRAVENOUS
Status: DISCONTINUED | OUTPATIENT
Start: 2018-12-20 | End: 2018-12-20

## 2018-12-20 RX ORDER — ROCURONIUM BROMIDE 10 MG/ML
INJECTION, SOLUTION INTRAVENOUS
Status: DISCONTINUED | OUTPATIENT
Start: 2018-12-20 | End: 2018-12-20

## 2018-12-20 RX ORDER — SODIUM CHLORIDE 9 MG/ML
INJECTION, SOLUTION INTRAVENOUS CONTINUOUS
Status: DISCONTINUED | OUTPATIENT
Start: 2018-12-20 | End: 2018-12-21

## 2018-12-20 RX ORDER — ALBUTEROL SULFATE 90 UG/1
2 AEROSOL, METERED RESPIRATORY (INHALATION) 2 TIMES DAILY PRN
Status: CANCELLED | OUTPATIENT
Start: 2018-12-20

## 2018-12-20 RX ORDER — HYDROMORPHONE HYDROCHLORIDE 1 MG/ML
INJECTION, SOLUTION INTRAMUSCULAR; INTRAVENOUS; SUBCUTANEOUS
Status: COMPLETED
Start: 2018-12-20 | End: 2018-12-20

## 2018-12-20 RX ORDER — OXYCODONE HYDROCHLORIDE 5 MG/1
5 TABLET ORAL EVERY 4 HOURS PRN
Status: DISCONTINUED | OUTPATIENT
Start: 2018-12-20 | End: 2018-12-23 | Stop reason: HOSPADM

## 2018-12-20 RX ORDER — ALBUMIN HUMAN 50 G/1000ML
SOLUTION INTRAVENOUS CONTINUOUS PRN
Status: DISCONTINUED | OUTPATIENT
Start: 2018-12-20 | End: 2018-12-20

## 2018-12-20 RX ORDER — MUPIROCIN 20 MG/G
OINTMENT TOPICAL
Status: DISCONTINUED | OUTPATIENT
Start: 2018-12-20 | End: 2018-12-20 | Stop reason: HOSPADM

## 2018-12-20 RX ORDER — KETAMINE HCL IN 0.9 % NACL 50 MG/5 ML
SYRINGE (ML) INTRAVENOUS
Status: DISCONTINUED | OUTPATIENT
Start: 2018-12-20 | End: 2018-12-20

## 2018-12-20 RX ORDER — ACETAMINOPHEN 10 MG/ML
1000 INJECTION, SOLUTION INTRAVENOUS
Status: COMPLETED | OUTPATIENT
Start: 2018-12-20 | End: 2018-12-20

## 2018-12-20 RX ORDER — ONDANSETRON 2 MG/ML
INJECTION INTRAMUSCULAR; INTRAVENOUS
Status: DISCONTINUED | OUTPATIENT
Start: 2018-12-20 | End: 2018-12-20

## 2018-12-20 RX ORDER — BUPIVACAINE HYDROCHLORIDE 5 MG/ML
INJECTION, SOLUTION EPIDURAL; INTRACAUDAL
Status: DISCONTINUED | OUTPATIENT
Start: 2018-12-20 | End: 2018-12-20 | Stop reason: HOSPADM

## 2018-12-20 RX ORDER — HYDROMORPHONE HYDROCHLORIDE 1 MG/ML
0.2 INJECTION, SOLUTION INTRAMUSCULAR; INTRAVENOUS; SUBCUTANEOUS EVERY 5 MIN PRN
Status: DISCONTINUED | OUTPATIENT
Start: 2018-12-20 | End: 2018-12-20 | Stop reason: HOSPADM

## 2018-12-20 RX ORDER — EPHEDRINE SULFATE 50 MG/ML
INJECTION, SOLUTION INTRAVENOUS
Status: DISCONTINUED | OUTPATIENT
Start: 2018-12-20 | End: 2018-12-20

## 2018-12-20 RX ORDER — METOCLOPRAMIDE HYDROCHLORIDE 5 MG/ML
5 INJECTION INTRAMUSCULAR; INTRAVENOUS EVERY 6 HOURS PRN
Status: DISCONTINUED | OUTPATIENT
Start: 2018-12-20 | End: 2018-12-23 | Stop reason: HOSPADM

## 2018-12-20 RX ORDER — FENTANYL CITRATE 50 UG/ML
25 INJECTION, SOLUTION INTRAMUSCULAR; INTRAVENOUS EVERY 5 MIN PRN
Status: DISCONTINUED | OUTPATIENT
Start: 2018-12-20 | End: 2018-12-20 | Stop reason: HOSPADM

## 2018-12-20 RX ORDER — FENTANYL CITRATE 50 UG/ML
INJECTION, SOLUTION INTRAMUSCULAR; INTRAVENOUS
Status: DISCONTINUED | OUTPATIENT
Start: 2018-12-20 | End: 2018-12-20

## 2018-12-20 RX ORDER — ACETAMINOPHEN 325 MG/1
650 TABLET ORAL EVERY 8 HOURS
Status: CANCELLED | OUTPATIENT
Start: 2018-12-21

## 2018-12-20 RX ORDER — FENTANYL CITRATE 50 UG/ML
INJECTION, SOLUTION INTRAMUSCULAR; INTRAVENOUS
Status: COMPLETED
Start: 2018-12-20 | End: 2018-12-20

## 2018-12-20 RX ORDER — MUPIROCIN 20 MG/G
1 OINTMENT TOPICAL 2 TIMES DAILY
Status: DISCONTINUED | OUTPATIENT
Start: 2018-12-20 | End: 2018-12-23 | Stop reason: HOSPADM

## 2018-12-20 RX ORDER — LIDOCAINE HYDROCHLORIDE 10 MG/ML
1 INJECTION, SOLUTION EPIDURAL; INFILTRATION; INTRACAUDAL; PERINEURAL ONCE
Status: DISCONTINUED | OUTPATIENT
Start: 2018-12-20 | End: 2018-12-23 | Stop reason: HOSPADM

## 2018-12-20 RX ORDER — METRONIDAZOLE 500 MG/100ML
500 INJECTION, SOLUTION INTRAVENOUS
Status: COMPLETED | OUTPATIENT
Start: 2018-12-20 | End: 2018-12-20

## 2018-12-20 RX ORDER — DEXAMETHASONE SODIUM PHOSPHATE 4 MG/ML
INJECTION, SOLUTION INTRA-ARTICULAR; INTRALESIONAL; INTRAMUSCULAR; INTRAVENOUS; SOFT TISSUE
Status: DISCONTINUED | OUTPATIENT
Start: 2018-12-20 | End: 2018-12-20

## 2018-12-20 RX ORDER — ENOXAPARIN SODIUM 100 MG/ML
40 INJECTION SUBCUTANEOUS DAILY
Status: CANCELLED | OUTPATIENT
Start: 2018-12-21

## 2018-12-20 RX ORDER — OXYCODONE HYDROCHLORIDE 10 MG/1
10 TABLET ORAL EVERY 4 HOURS PRN
Status: DISCONTINUED | OUTPATIENT
Start: 2018-12-20 | End: 2018-12-23 | Stop reason: HOSPADM

## 2018-12-20 RX ORDER — ACETAMINOPHEN 10 MG/ML
1000 INJECTION, SOLUTION INTRAVENOUS EVERY 8 HOURS
Status: CANCELLED | OUTPATIENT
Start: 2018-12-20 | End: 2018-12-21

## 2018-12-20 RX ORDER — SODIUM CHLORIDE 0.9 % (FLUSH) 0.9 %
3 SYRINGE (ML) INJECTION
Status: DISCONTINUED | OUTPATIENT
Start: 2018-12-20 | End: 2018-12-23 | Stop reason: HOSPADM

## 2018-12-20 RX ORDER — LIDOCAINE HCL/PF 100 MG/5ML
SYRINGE (ML) INTRAVENOUS
Status: DISCONTINUED | OUTPATIENT
Start: 2018-12-20 | End: 2018-12-20

## 2018-12-20 RX ORDER — PROPOFOL 10 MG/ML
VIAL (ML) INTRAVENOUS
Status: DISCONTINUED | OUTPATIENT
Start: 2018-12-20 | End: 2018-12-20

## 2018-12-20 RX ORDER — HYDROMORPHONE HYDROCHLORIDE 1 MG/ML
1 INJECTION, SOLUTION INTRAMUSCULAR; INTRAVENOUS; SUBCUTANEOUS EVERY 6 HOURS PRN
Status: CANCELLED | OUTPATIENT
Start: 2018-12-20

## 2018-12-20 RX ORDER — MIDAZOLAM HYDROCHLORIDE 1 MG/ML
INJECTION, SOLUTION INTRAMUSCULAR; INTRAVENOUS
Status: DISCONTINUED | OUTPATIENT
Start: 2018-12-20 | End: 2018-12-20

## 2018-12-20 RX ADMIN — EPHEDRINE SULFATE 5 MG: 50 INJECTION, SOLUTION INTRAMUSCULAR; INTRAVENOUS; SUBCUTANEOUS at 04:12

## 2018-12-20 RX ADMIN — FENTANYL CITRATE 100 MCG: 50 INJECTION, SOLUTION INTRAMUSCULAR; INTRAVENOUS at 12:12

## 2018-12-20 RX ADMIN — PHENYLEPHRINE HYDROCHLORIDE 100 MCG: 10 INJECTION INTRAVENOUS at 01:12

## 2018-12-20 RX ADMIN — ONDANSETRON 4 MG: 2 INJECTION INTRAMUSCULAR; INTRAVENOUS at 05:12

## 2018-12-20 RX ADMIN — SODIUM CHLORIDE, SODIUM GLUCONATE, SODIUM ACETATE, POTASSIUM CHLORIDE, MAGNESIUM CHLORIDE, SODIUM PHOSPHATE, DIBASIC, AND POTASSIUM PHOSPHATE: .53; .5; .37; .037; .03; .012; .00082 INJECTION, SOLUTION INTRAVENOUS at 04:12

## 2018-12-20 RX ADMIN — SODIUM CHLORIDE: 0.9 INJECTION, SOLUTION INTRAVENOUS at 12:12

## 2018-12-20 RX ADMIN — ROCURONIUM BROMIDE 30 MG: 10 INJECTION, SOLUTION INTRAVENOUS at 12:12

## 2018-12-20 RX ADMIN — ROCURONIUM BROMIDE 10 MG: 10 INJECTION, SOLUTION INTRAVENOUS at 05:12

## 2018-12-20 RX ADMIN — SUGAMMADEX 210 MG: 100 INJECTION, SOLUTION INTRAVENOUS at 05:12

## 2018-12-20 RX ADMIN — HYDROMORPHONE HYDROCHLORIDE 0.2 MG: 1 INJECTION, SOLUTION INTRAMUSCULAR; INTRAVENOUS; SUBCUTANEOUS at 06:12

## 2018-12-20 RX ADMIN — METRONIDAZOLE 500 MG: 500 SOLUTION INTRAVENOUS at 12:12

## 2018-12-20 RX ADMIN — IBUPROFEN 800 MG: 800 INJECTION INTRAVENOUS at 10:12

## 2018-12-20 RX ADMIN — SODIUM CHLORIDE, SODIUM GLUCONATE, SODIUM ACETATE, POTASSIUM CHLORIDE, MAGNESIUM CHLORIDE, SODIUM PHOSPHATE, DIBASIC, AND POTASSIUM PHOSPHATE: .53; .5; .37; .037; .03; .012; .00082 INJECTION, SOLUTION INTRAVENOUS at 12:12

## 2018-12-20 RX ADMIN — ACETAMINOPHEN 1000 MG: 10 INJECTION, SOLUTION INTRAVENOUS at 10:12

## 2018-12-20 RX ADMIN — Medication 10 MG: at 04:12

## 2018-12-20 RX ADMIN — PHENYLEPHRINE HYDROCHLORIDE 100 MCG: 10 INJECTION INTRAVENOUS at 12:12

## 2018-12-20 RX ADMIN — ALBUMIN HUMAN: 0.05 INJECTION, SOLUTION INTRAVENOUS at 04:12

## 2018-12-20 RX ADMIN — EPHEDRINE SULFATE 5 MG: 50 INJECTION, SOLUTION INTRAMUSCULAR; INTRAVENOUS; SUBCUTANEOUS at 03:12

## 2018-12-20 RX ADMIN — SODIUM CHLORIDE: 0.9 INJECTION, SOLUTION INTRAVENOUS at 10:12

## 2018-12-20 RX ADMIN — SODIUM CHLORIDE, SODIUM GLUCONATE, SODIUM ACETATE, POTASSIUM CHLORIDE, MAGNESIUM CHLORIDE, SODIUM PHOSPHATE, DIBASIC, AND POTASSIUM PHOSPHATE: .53; .5; .37; .037; .03; .012; .00082 INJECTION, SOLUTION INTRAVENOUS at 02:12

## 2018-12-20 RX ADMIN — IBUPROFEN 800 MG: 800 INJECTION INTRAVENOUS at 11:12

## 2018-12-20 RX ADMIN — LIDOCAINE HYDROCHLORIDE 100 MG: 20 INJECTION, SOLUTION INTRAVENOUS at 12:12

## 2018-12-20 RX ADMIN — OXYCODONE HYDROCHLORIDE 10 MG: 10 TABLET ORAL at 10:12

## 2018-12-20 RX ADMIN — DEXAMETHASONE SODIUM PHOSPHATE 4 MG: 4 INJECTION, SOLUTION INTRAMUSCULAR; INTRAVENOUS at 01:12

## 2018-12-20 RX ADMIN — SODIUM CHLORIDE, SODIUM GLUCONATE, SODIUM ACETATE, POTASSIUM CHLORIDE, MAGNESIUM CHLORIDE, SODIUM PHOSPHATE, DIBASIC, AND POTASSIUM PHOSPHATE: .53; .5; .37; .037; .03; .012; .00082 INJECTION, SOLUTION INTRAVENOUS at 01:12

## 2018-12-20 RX ADMIN — Medication 20 MG: at 12:12

## 2018-12-20 RX ADMIN — HEPARIN SODIUM 5000 UNITS: 5000 INJECTION, SOLUTION INTRAVENOUS; SUBCUTANEOUS at 10:12

## 2018-12-20 RX ADMIN — FENTANYL CITRATE 25 MCG: 50 INJECTION INTRAMUSCULAR; INTRAVENOUS at 07:12

## 2018-12-20 RX ADMIN — MUPIROCIN: 20 OINTMENT TOPICAL at 10:12

## 2018-12-20 RX ADMIN — MUPIROCIN 1 G: 20 OINTMENT TOPICAL at 10:12

## 2018-12-20 RX ADMIN — SODIUM CHLORIDE: 0.9 INJECTION, SOLUTION INTRAVENOUS at 06:12

## 2018-12-20 RX ADMIN — ROCURONIUM BROMIDE 20 MG: 10 INJECTION, SOLUTION INTRAVENOUS at 03:12

## 2018-12-20 RX ADMIN — ROCURONIUM BROMIDE 10 MG: 10 INJECTION, SOLUTION INTRAVENOUS at 01:12

## 2018-12-20 RX ADMIN — Medication 10 MG: at 02:12

## 2018-12-20 RX ADMIN — PROPOFOL 130 MG: 10 INJECTION, EMULSION INTRAVENOUS at 12:12

## 2018-12-20 RX ADMIN — ROCURONIUM BROMIDE 10 MG: 10 INJECTION, SOLUTION INTRAVENOUS at 02:12

## 2018-12-20 RX ADMIN — MIDAZOLAM HYDROCHLORIDE 2 MG: 1 INJECTION, SOLUTION INTRAMUSCULAR; INTRAVENOUS at 12:12

## 2018-12-20 RX ADMIN — Medication 10 MG: at 03:12

## 2018-12-20 RX ADMIN — FENTANYL CITRATE 25 MCG: 50 INJECTION INTRAMUSCULAR; INTRAVENOUS at 06:12

## 2018-12-20 RX ADMIN — OXYCODONE HYDROCHLORIDE 10 MG: 10 TABLET ORAL at 06:12

## 2018-12-20 RX ADMIN — FENTANYL CITRATE 50 MCG: 50 INJECTION, SOLUTION INTRAMUSCULAR; INTRAVENOUS at 05:12

## 2018-12-20 RX ADMIN — GENTAMICIN SULFATE 263 MG: 40 INJECTION, SOLUTION INTRAMUSCULAR; INTRAVENOUS at 12:12

## 2018-12-20 NOTE — BRIEF OP NOTE
[facility]  Surgery Department  Operative Note    SUMMARY     Date of Procedure: 12/20/2018     Procedure: Procedure(s) (LRB):  COLECTOMY, WITH COLOANAL ANASTOMOSIS (N/A)  CREATION, ILEOSTOMY (N/A)  RESECTION, COLON, LOW ANTERIOR  MOBILIZATION, SPLENIC FLEXURE  WRAP-OMENTAL     Surgeon(s) and Role:     * JADA Benavides MD - Primary     * Oscar Ngo MD - Resident - Assisting        Pre-Operative Diagnosis: Rectal cancer [C20]    Post-Operative Diagnosis: Post-Op Diagnosis Codes:     * Rectal cancer [C20]    Anesthesia: General    Technical Procedures Used: low anterior resection with intersphincteric dissection and handsewn coloanal j pouch anastomosis, mobilization of the splenic flexure, placement of omental wrap, diverting loop ileostomy    Description of the Findings of the Procedure: low rectal cancer, tumor abutting CRM on frozen section, negative margins upon re-resection    Complications: No    Estimated Blood Loss (EBL): 300 mL           Implants:   Implant Name Type Inv. Item Serial No.  Lot No. LRB No. Used   BARRIER SEPRAFILM ADHESION - [id number]  BAR[employer] SEPRAFILM ADHESION  [employer] [id number] N/A 6       Specimens:   Specimen (12h ago, onward)    Start     Ordered    12/20/18 1622  Specimen to Pathology - Surgery  Once     Comments:  Suture on new radial margin frozen section     Start Status   12/20/18 1622 Collected (12/20/18 1623)       12/20/18 1622    12/20/18 1521  Specimen to Pathology - Surgery  Once     Comments:  rectum and anal canal     Start Status   12/20/18 1521 Collected (12/20/18 1521)       12/20/18 1521                  Condition: Good    Disposition: PACU - hemodynamically stable.    Attestation: I was present and scrubbed for the entire procedure.

## 2018-12-20 NOTE — TELEPHONE ENCOUNTER
----- Message from Sasha Costa sent at 12/20/2018  9:08 AM CST -----  Contact: Pt:[phone]  .Patient Returning Call from [facility]    Who Left Message for Patient:Radha VIK  Communication Preference:Pt:[phone]  Additional Information:

## 2018-12-20 NOTE — TELEPHONE ENCOUNTER
----- Message from Sasha Costa sent at 12/20/2018  8:34 AM CST -----  Contact: Pt:[phone]  .Needs Advice    Reason for call:Pt called and state she would like to speak with the nurse in regards to her surgery.         Communication Preference:Pt:[phone]    Additional Information:

## 2018-12-21 PROBLEM — E87.6 HYPOKALEMIA: Status: ACTIVE | Noted: 2018-12-21

## 2018-12-21 PROBLEM — E83.42 HYPOMAGNESEMIA: Status: ACTIVE | Noted: 2018-12-21

## 2018-12-21 LAB
ANION GAP SERPL CALC-SCNC: 10 MMOL/L
ANION GAP SERPL CALC-SCNC: 6 MMOL/L
BASOPHILS # BLD AUTO: 0.01 K/UL
BASOPHILS NFR BLD: 0.1 %
BUN SERPL-MCNC: 4 MG/DL
BUN SERPL-MCNC: 6 MG/DL
CALCIUM SERPL-MCNC: 7.5 MG/DL
CALCIUM SERPL-MCNC: 8.1 MG/DL
CHLORIDE SERPL-SCNC: 104 MMOL/L
CHLORIDE SERPL-SCNC: 105 MMOL/L
CO2 SERPL-SCNC: 23 MMOL/L
CO2 SERPL-SCNC: 25 MMOL/L
CREAT SERPL-MCNC: 0.5 MG/DL
CREAT SERPL-MCNC: 0.6 MG/DL
DIFFERENTIAL METHOD: ABNORMAL
EOSINOPHIL # BLD AUTO: 0 K/UL
EOSINOPHIL NFR BLD: 0.1 %
ERYTHROCYTE [DISTWIDTH] IN BLOOD BY AUTOMATED COUNT: 14.6 %
EST. GFR  (AFRICAN AMERICAN): >60 ML/MIN/1.73 M^2
EST. GFR  (AFRICAN AMERICAN): >60 ML/MIN/1.73 M^2
EST. GFR  (NON AFRICAN AMERICAN): >60 ML/MIN/1.73 M^2
EST. GFR  (NON AFRICAN AMERICAN): >60 ML/MIN/1.73 M^2
GLUCOSE SERPL-MCNC: 113 MG/DL
GLUCOSE SERPL-MCNC: 153 MG/DL
HCT VFR BLD AUTO: 36 %
HGB BLD-MCNC: 11.4 G/DL
IMM GRANULOCYTES # BLD AUTO: 0.06 K/UL
IMM GRANULOCYTES NFR BLD AUTO: 0.9 %
LYMPHOCYTES # BLD AUTO: 0.4 K/UL
LYMPHOCYTES NFR BLD: 5.3 %
MAGNESIUM SERPL-MCNC: 1.2 MG/DL
MAGNESIUM SERPL-MCNC: 1.8 MG/DL
MCH RBC QN AUTO: 29.8 PG
MCHC RBC AUTO-ENTMCNC: 31.7 G/DL
MCV RBC AUTO: 94 FL
MONOCYTES # BLD AUTO: 0.2 K/UL
MONOCYTES NFR BLD: 3.6 %
NEUTROPHILS # BLD AUTO: 6.1 K/UL
NEUTROPHILS NFR BLD: 90 %
NRBC BLD-RTO: 0 /100 WBC
PHOSPHATE SERPL-MCNC: 3.7 MG/DL
PLATELET # BLD AUTO: 243 K/UL
PMV BLD AUTO: 11.4 FL
POTASSIUM SERPL-SCNC: 2.6 MMOL/L
POTASSIUM SERPL-SCNC: 3.3 MMOL/L
RBC # BLD AUTO: 3.83 M/UL
SODIUM SERPL-SCNC: 136 MMOL/L
SODIUM SERPL-SCNC: 137 MMOL/L
WBC # BLD AUTO: 6.74 K/UL

## 2018-12-21 PROCEDURE — 25000003 PHARM REV CODE 250: Performed by: STUDENT IN AN ORGANIZED HEALTH CARE EDUCATION/TRAINING PROGRAM

## 2018-12-21 PROCEDURE — 63600175 PHARM REV CODE 636 W HCPCS: Performed by: NURSE PRACTITIONER

## 2018-12-21 PROCEDURE — G8987 SELF CARE CURRENT STATUS: HCPCS | Mod: CI

## 2018-12-21 PROCEDURE — G8988 SELF CARE GOAL STATUS: HCPCS | Mod: CI

## 2018-12-21 PROCEDURE — G8979 MOBILITY GOAL STATUS: HCPCS | Mod: CJ

## 2018-12-21 PROCEDURE — G8989 SELF CARE D/C STATUS: HCPCS | Mod: CI

## 2018-12-21 PROCEDURE — 80048 BASIC METABOLIC PNL TOTAL CA: CPT

## 2018-12-21 PROCEDURE — 83735 ASSAY OF MAGNESIUM: CPT

## 2018-12-21 PROCEDURE — 20600001 HC STEP DOWN PRIVATE ROOM

## 2018-12-21 PROCEDURE — G8978 MOBILITY CURRENT STATUS: HCPCS | Mod: CJ

## 2018-12-21 PROCEDURE — 97530 THERAPEUTIC ACTIVITIES: CPT

## 2018-12-21 PROCEDURE — 80048 BASIC METABOLIC PNL TOTAL CA: CPT | Mod: 91

## 2018-12-21 PROCEDURE — 84100 ASSAY OF PHOSPHORUS: CPT

## 2018-12-21 PROCEDURE — G8980 MOBILITY D/C STATUS: HCPCS | Mod: CJ

## 2018-12-21 PROCEDURE — 83735 ASSAY OF MAGNESIUM: CPT | Mod: 91

## 2018-12-21 PROCEDURE — 25000003 PHARM REV CODE 250: Performed by: NURSE PRACTITIONER

## 2018-12-21 PROCEDURE — 36415 COLL VENOUS BLD VENIPUNCTURE: CPT

## 2018-12-21 PROCEDURE — 63600175 PHARM REV CODE 636 W HCPCS: Performed by: STUDENT IN AN ORGANIZED HEALTH CARE EDUCATION/TRAINING PROGRAM

## 2018-12-21 PROCEDURE — 97165 OT EVAL LOW COMPLEX 30 MIN: CPT

## 2018-12-21 PROCEDURE — 97116 GAIT TRAINING THERAPY: CPT

## 2018-12-21 PROCEDURE — 97161 PT EVAL LOW COMPLEX 20 MIN: CPT

## 2018-12-21 PROCEDURE — 85025 COMPLETE CBC W/AUTO DIFF WBC: CPT

## 2018-12-21 RX ORDER — POTASSIUM CHLORIDE 7.45 MG/ML
10 INJECTION INTRAVENOUS
Status: COMPLETED | OUTPATIENT
Start: 2018-12-21 | End: 2018-12-21

## 2018-12-21 RX ORDER — SIMETHICONE 80 MG
1 TABLET,CHEWABLE ORAL 3 TIMES DAILY PRN
Status: DISCONTINUED | OUTPATIENT
Start: 2018-12-21 | End: 2018-12-23 | Stop reason: HOSPADM

## 2018-12-21 RX ORDER — PANTOPRAZOLE SODIUM 40 MG/1
40 TABLET, DELAYED RELEASE ORAL DAILY
Status: DISCONTINUED | OUTPATIENT
Start: 2018-12-21 | End: 2018-12-23 | Stop reason: HOSPADM

## 2018-12-21 RX ORDER — MAGNESIUM SULFATE HEPTAHYDRATE 40 MG/ML
2 INJECTION, SOLUTION INTRAVENOUS ONCE
Status: COMPLETED | OUTPATIENT
Start: 2018-12-21 | End: 2018-12-21

## 2018-12-21 RX ORDER — DEXTROSE MONOHYDRATE, SODIUM CHLORIDE, AND POTASSIUM CHLORIDE 50; 1.49; 2.25 G/1000ML; G/1000ML; G/1000ML
INJECTION, SOLUTION INTRAVENOUS CONTINUOUS
Status: DISCONTINUED | OUTPATIENT
Start: 2018-12-21 | End: 2018-12-22

## 2018-12-21 RX ADMIN — IBUPROFEN 800 MG: 800 INJECTION INTRAVENOUS at 11:12

## 2018-12-21 RX ADMIN — POTASSIUM CHLORIDE 10 MEQ: 7.46 INJECTION, SOLUTION INTRAVENOUS at 12:12

## 2018-12-21 RX ADMIN — MUPIROCIN 1 G: 20 OINTMENT TOPICAL at 09:12

## 2018-12-21 RX ADMIN — OXYCODONE HYDROCHLORIDE 10 MG: 10 TABLET ORAL at 03:12

## 2018-12-21 RX ADMIN — IBUPROFEN 800 MG: 800 INJECTION INTRAVENOUS at 05:12

## 2018-12-21 RX ADMIN — POTASSIUM CHLORIDE 10 MEQ: 7.46 INJECTION, SOLUTION INTRAVENOUS at 10:12

## 2018-12-21 RX ADMIN — OXYCODONE HYDROCHLORIDE 10 MG: 10 TABLET ORAL at 05:12

## 2018-12-21 RX ADMIN — POTASSIUM CHLORIDE 10 MEQ: 7.46 INJECTION, SOLUTION INTRAVENOUS at 01:12

## 2018-12-21 RX ADMIN — OXYCODONE HYDROCHLORIDE 10 MG: 10 TABLET ORAL at 08:12

## 2018-12-21 RX ADMIN — OXYCODONE HYDROCHLORIDE 10 MG: 10 TABLET ORAL at 12:12

## 2018-12-21 RX ADMIN — MUPIROCIN 1 G: 20 OINTMENT TOPICAL at 08:12

## 2018-12-21 RX ADMIN — SODIUM CHLORIDE: 0.9 INJECTION, SOLUTION INTRAVENOUS at 03:12

## 2018-12-21 RX ADMIN — OXYCODONE HYDROCHLORIDE 10 MG: 10 TABLET ORAL at 09:12

## 2018-12-21 RX ADMIN — IBUPROFEN 800 MG: 800 INJECTION INTRAVENOUS at 01:12

## 2018-12-21 RX ADMIN — DEXTROSE MONOHYDRATE, SODIUM CHLORIDE, AND POTASSIUM CHLORIDE: 50; 2.25; 1.49 INJECTION, SOLUTION INTRAVENOUS at 09:12

## 2018-12-21 RX ADMIN — PANTOPRAZOLE SODIUM 40 MG: 40 TABLET, DELAYED RELEASE ORAL at 08:12

## 2018-12-21 RX ADMIN — POTASSIUM CHLORIDE 10 MEQ: 7.46 INJECTION, SOLUTION INTRAVENOUS at 11:12

## 2018-12-21 RX ADMIN — MAGNESIUM SULFATE IN WATER 2 G: 40 INJECTION, SOLUTION INTRAVENOUS at 08:12

## 2018-12-21 RX ADMIN — SIMETHICONE CHEW TAB 80 MG 80 MG: 80 TABLET ORAL at 11:12

## 2018-12-21 RX ADMIN — DEXTROSE MONOHYDRATE, SODIUM CHLORIDE, AND POTASSIUM CHLORIDE: 50; 2.25; 1.49 INJECTION, SOLUTION INTRAVENOUS at 10:12

## 2018-12-21 NOTE — TRANSFER OF CARE
"Anesthesia Transfer of Care Note    Patient: Susannah Xie    Procedure(s) Performed: Procedure(s) (LRB):  COLECTOMY, WITH COLOANAL ANASTOMOSIS (N/A)  CREATION, ILEOSTOMY (N/A)  RESECTION, COLON, LOW ANTERIOR  MOBILIZATION, SPLENIC FLEXURE  WRAP-OMENTAL    Patient location: PACU    Anesthesia Type: general    Transport from OR: Transported from OR on 6-10 L/min O2 by face mask with adequate spontaneous ventilation    Post pain: adequate analgesia    Post assessment: tolerated procedure well and no apparent anesthetic complications    Post vital signs: stable    Level of consciousness: sedated and responds to stimulation    Nausea/Vomiting: no nausea/vomiting    Complications: none    Transfer of care protocol was followed      Last vitals:   Visit Vitals  /61 (BP Location: Left arm, Patient Position: Lying)   Pulse 78   Temp 36.2 °C (97.2 °F) (Temporal)   Resp 20   Ht 5' 7" (1.702 m)   Wt 52.6 kg (116 lb)   SpO2 100%   Breastfeeding? No   BMI 18.17 kg/m²     "

## 2018-12-21 NOTE — PLAN OF CARE
Problem: Physical Therapy Goal  Goal: Physical Therapy Goal  Outcome: Outcome(s) achieved Date Met: 12/21/18  Patient at this time is at their functional baseline and does not require skilled acute PT services at this time. Please re consult PT if pt has change in functional status.   Jermaine Stockton PT, DPT  12/21/2018  Pager: [phone]

## 2018-12-21 NOTE — ASSESSMENT & PLAN NOTE
1 Day Post-Op low anterior/coloanal with DLI, doing well    Multimodal pain control  OOB/ambulate, PT/OT  Clear liquids + IVF  Rdz until day 2  Stoma teaching  Incentive spirometry, patient reminded to use 6x/hour  Home meds  DVT ppx - lovenox

## 2018-12-21 NOTE — PT/OT/SLP EVAL
Physical Therapy Evaluation and Discharge Note    Patient Name:  Susannah Xie   MRN:  [id number]    Recommendations:     Discharge Recommendations:  (home health)   Discharge Equipment Recommendations: walker, rolling   Barriers to discharge: None    Assessment:     Susannah Xie is a 63 y.o. female admitted with a medical diagnosis of Rectal cancer. .  At this time, patient is functioning at their prior level of function and does not require further acute PT services.     Recent Surgery: Procedure(s) (LRB):  COLECTOMY, WITH COLOANAL ANASTOMOSIS (N/A)  CREATION, ILEOSTOMY (N/A)  RESECTION, COLON, LOW ANTERIOR  MOBILIZATION, SPLENIC FLEXURE  WRAP-OMENTAL 1 Day Post-Op    Plan:     During this hospitalization, patient does not require further acute PT services.  Please re-consult if situation changes.      Subjective     Chief Complaint: pt states she doesn't want to use a RW but will use it for now.   Patient/Family Comments/goals: to return home  Pain/Comfort:  · Pain Rating 1: (none quantifed)    Patients cultural, spiritual, Christian conflicts given the current situation: no    Living Environment:  Pt lives in a [facility] with no JULIUS with her .   Prior to admission, patients level of function was ( I) with all activities; not driving.  Equipment used at home: none.  DME owned (not currently used): none.  Upon discharge, patient will have assistance from  and family.    Objective:     Communicated with RN  prior to session.  Patient found supine upon PT entry to room found with: chest tube, peripheral IV     General Precautions: Standard, fall   Orthopedic Precautions:N/A   Braces: N/A     Exams:  · Cognitive Exam:  Patient is oriented to Person, Place, Time and Situation  · Fine Motor Coordination: -       Intact  · Gross Motor Coordination:  WFL  · Postural Exam:  Patient presented with the following abnormalities: -       No postural abnormalities identified  · Sensation: -       Intact  · Skin  Integrity/Edema:  -       Skin integrity: Visible skin intact  · RLE Strength: WFL  · LLE ROM: WFL  · LLE Strength: WFL    Functional Mobility:  · Bed Mobility:  Rolling Right: contact guard assistance  · Supine to Sit: contact guard assistance  · Sit to Supine: contact guard assistance  · Transfers:  Sit to Stand:  contact guard assistance with no AD  · Bed to Chair: contact guard assistance with  rolling walker  using  Step Transfer  · Gait: x 25 feet with CGA; no AD,  x 184 feet with RW with CGA/SBA; pt req v/c for mgmt of RW but was able to use it appropriate once instructed  · Balance: CGA/SBA with RW for BUE support; able to take mulcitple turns in both directons without increase sway or LOB    AM-PAC 6 CLICK MOBILITY  Total Score:21       Therapeutic Activities and Exercises:    Patient education  · Patient educated on the role of PT and POC  · Patient educated on importance  activity while in the hosptial per tolerance for improved endurance and to limit deconditioning   · Patient educated on safe transfers with nursing as appropriate  · Patient educated on energy conservation, pursed lip breathing  · Patient educated on proper transfer mechanics and safety  · All of patients questions were answered within the scope of PT        AM-PAC 6 CLICK MOBILITY  Total Score:21     Patient left up in chair with all lines intact, call button in reach and RN, sister present.    GOALS:   Multidisciplinary Problems     Physical Therapy Goals     Not on file          Multidisciplinary Problems (Resolved)        Problem: Physical Therapy Goal    Goal Priority Disciplines Outcome Goal Variances Interventions   Physical Therapy Goal   (Resolved)     PT, PT/OT Outcome(s) achieved                     History:     Past Medical History:   Diagnosis Date    Cancer     COPD (chronic obstructive pulmonary disease) with chronic bronchitis     GERD (gastroesophageal reflux disease)     Tobacco abuse        Past Surgical History:    Procedure Laterality Date    APPENDECTOMY      COLECTOMY, WITH COLOANAL ANASTOMOSIS N/A 12/20/2018    Performed by JADA Benavides MD at [facility] OR 2ND FLR    COLONOSCOPY N/A 12/12/2018    Performed by Jeyson Pizarro MD at [facility] ENDO (4TH FLR)    CREATION, ILEOSTOMY N/A 12/20/2018    Performed by [facility]ides MD at [facility] OR 2ND FLR    MOBILIZATION, SPLENIC FLEXURE  12/20/2018    Performed by [facility]ides MD at [facility] OR 2ND FLR    PORTACATH PLACEMENT Left     chest    RESECTION, COLON, LOW ANTERIOR  12/20/2018    Performed by J[facility]avides MD at [facility] OR 2ND FLR    TOTAL ABDOMINAL HYSTERECTOMY W/ BILATERAL SALPINGOOPHORECTOMY      WRAP-OMENTAL  12/20/2018    Performed by JADA Benavides MD at [facility] OR 2ND FLR       Clinical Decision Making:     History  Co-morbidities and personal factors that may impact the plan of care Examination  Body Structures and Functions, activity limitations and participation restrictions that may impact the plan of care Clinical Presentation   Decision Making/ Complexity Score   Co-morbidities:   [] Time since onset of injury / illness / exacerbation  [] Status of current condition  []Patient's cognitive status and safety concerns    [x] Multiple Medical Problems (see med hx)  Personal Factors:   [] Patient's age  [] Prior Level of function   [] Patient's home situation (environment and family support)  [] Patient's level of motivation  [] Expected progression of patient      HISTORY:(criteria)    [] 22853 - no personal factors/history    [x] 09364 - has 1-2 personal factor/comorbidity     [] 68960 - has >3 personal factor/comorbidity     Body Regions:  [] Objective examination findings  [] Head     []  Neck  [x] Trunk   [] Upper Extremity  [] Lower Extremity    Body Systems:  [] For communication ability, affect, cognition, language, and learning style: the assessment of the ability to make needs known, consciousness, orientation (person, place, and time), expected  emotional /behavioral responses, and learning preferences (eg, learning barriers, education  needs)  [] For the neuromuscular system: a general assessment of gross coordinated movement (eg, balance, gait, locomotion, transfers, and transitions) and motor function  (motor control and motor learning)  [] For the musculoskeletal system: the assessment of gross symmetry, gross range of motion, gross strength, height, and weight  [] For the integumentary system: the assessment of pliability(texture), presence of scar formation, skin color, and skin integrity  [x] For cardiovascular/pulmonary system: the assessment of heart rate, respiratory rate, blood pressure, and edema     Activity limitations:    [] Patient's cognitive status and saf ety concerns          [] Status of current condition      [] Weight bearing restriction  [] Cardiopulmunary Restriction    Participation Restrictions:   [] Goals and goal agreement with the patient     [] Rehab potential (prognosis) and probable outcome      Examination of Body System: (criteria)    [x] 11609 - addressing 1-2 elements    [] 91140 - addressing a total of 3 or more elements     [] 66671 -  Addressing a total of 4 or more elements         Clinical Presentation: (criteria)  Stable - 17400     On examination of body system using standardized tests and measures patient presents with 1-2 elements from any of the following: body structures and functions, activity limitations, and/or participation restrictions.  Leading to a clinical presentation that is considered stable and/or uncomplicated                              Clinical Decision Making  (Eval Complexity):  Low- 55769     Time Tracking:     PT Received On: 12/21/18  PT Start Time: 1010     PT Stop Time: 1034  PT Total Time (min): 24 min     Billable Minutes: Evaluation 10 min and Gait Training 8 min      Jermaine Stockton PT  12/21/2018

## 2018-12-21 NOTE — CONSULTS
Patient seen for new ostomy consult. Initial ostomy education begun.  Goals of education include:     Pouch emptying, sizing/cuting pouch, and applying pouch   Stoma and davonte-stomal care   Food choices and hydration   Obtaining supplies    Discussed cutting ostomy pouch and emptying pouch of stool and gas.  Discussed meal planning with particular foods to avoid.  Discussed importance of hydration and increasing fluid intake.  Explained process of ordering supplies to be delivered to patients home with osteo group. Provided reading materials regarding todays training and will follow up with patient tomorrow.  Offered patient to have any friend or family to attend next training session.       12/21/18 1230       Ileostomy 12/20/18 2015 RLQ   Placement Date/Time: 12/20/18 2015   Present Prior to Hospital Arrival?: No  Inserted by: MD  Location: RLQ   Stoma Appearance rosebud appearance;moist;red;protruding above skin level   Appliance 1-piece;no leakage;intact   Stoma Function no flatus;green;thin liquid   Peristomal Assessment Clean;Intact

## 2018-12-21 NOTE — PLAN OF CARE
Problem: Adjustment to Surgery (Ileostomy)  Goal: Psychosocial Adjustment Initiation    Intervention: Support Psychosocial Response to Surgery and Ostomy  Patient able to look at ileostomy, asking questions about care

## 2018-12-21 NOTE — PLAN OF CARE
POC reviewed with pt, acknowledged understanding. Pt AAO x 4. Pt remains free of falls/injuries. Pt on telemetry remains SR. Pt tolerating clear liquid diet. Pt pain moderately controlled with prescribed meds. Pt wearing SCDs. Pt voids per cathter, output recorded. No acute events throughout shift. No distress noted, will continue to monitor.

## 2018-12-21 NOTE — SUBJECTIVE & OBJECTIVE
Subjective:     Interval History: no acute events overnight. Some belching but denies n/v. Pain well controlled.    Post-Op Info:  Procedure(s) (LRB):  COLECTOMY, WITH COLOANAL ANASTOMOSIS (N/A)  CREATION, ILEOSTOMY (N/A)  RESECTION, COLON, LOW ANTERIOR  MOBILIZATION, SPLENIC FLEXURE  WRAP-OMENTAL   1 Day Post-Op      Medications:  Continuous Infusions:   sodium chloride 0.9% 70 mL/hr at 12/20/18 1047    sodium chloride 0.9% 100 mL/hr at 12/21/18 0335     Scheduled Meds:   ibuprofen  800 mg Intravenous Q8H    lidocaine (PF) 10 mg/ml (1%)  1 mL Intradermal Once    mupirocin  1 g Nasal BID     PRN Meds:   metoclopramide HCl    naloxone    ondansetron    oxyCODONE    oxyCODONE    sodium chloride 0.9%        Objective:     Vital Signs (Most Recent):  Temp: 97.7 °F (36.5 °C) (12/21/18 0425)  Pulse: 77 (12/21/18 0425)  Resp: 17 (12/21/18 0425)  BP: (!) 164/71 (12/21/18 0425)  SpO2: 99 % (12/21/18 0425) Vital Signs (24h Range):  Temp:  [97.2 °F (36.2 °C)-98.3 °F (36.8 °C)] 97.7 °F (36.5 °C)  Pulse:  [66-97] 77  Resp:  [12-20] 17  SpO2:  [95 %-100 %] 99 %  BP: (127-194)/(56-97) 164/71     Intake/Output - Last 3 Shifts       12/19 0700 - 12/20 0659 12/20 0700 - 12/21 0659 12/21 0700 - 12/22 0659    P.O.  120     I.V. (mL/kg)  3600 (68.4)     Total Intake(mL/kg)  3720 (70.7)     Urine (mL/kg/hr)  1170     Stool  0     Blood  300     Total Output  1470     Net  +2250            Stool Occurrence  0 x           Physical Exam   Constitutional: She is oriented to person, place, and time. She appears well-developed and well-nourished. No distress.   HENT:   Head: Normocephalic and atraumatic.   Eyes: Pupils are equal, round, and reactive to light. No scleral icterus.   Neck: Normal range of motion. Neck supple.   Cardiovascular: Normal rate and regular rhythm.   Pulmonary/Chest: Effort normal. No respiratory distress.   Abdominal: Soft. She exhibits no distension and no mass. There is tenderness (incisional). There is  no guarding.   Incision c/d/i  Stoma pink/viable, small amount of liquid stool   Neurological: She is alert and oriented to person, place, and time.   Skin: Skin is warm and dry. She is not diaphoretic.   Psychiatric: She has a normal mood and affect.     Significant Labs:  BMP (Last 3 Results): No results for input(s): GLU, NA, K, CL, CO2, BUN, CREATININE, CALCIUM, MG in the last 168 hours.  CBC (Last 3 Results): No results for input(s): WBC, RBC, HGB, HCT, PLT, MCV, MCH, MCHC in the last 168 hours.    Significant Diagnostics:  I have reviewed all pertinent imaging results/findings within the past 24 hours.

## 2018-12-21 NOTE — PLAN OF CARE
Problem: Pain Acute  Goal: Optimal Pain Control    Intervention: Develop Pain Management Plan  Pain management explained to patient and when t ask for medication. Pain moderately controlled with PRN medication

## 2018-12-21 NOTE — NURSING TRANSFER
Nursing Transfer Note      12/20/2018     Transfer 1026    Transfer via stretcher    Transfer with O2 2 L NC, NS    Transported by Transport    Medicines sent:NS    Chart send with patient: ranjith    Notified: AURY Nelson and Daughter at the bedside    Patient reassessed at: upon arrival to the unot

## 2018-12-21 NOTE — ANESTHESIA POSTPROCEDURE EVALUATION
"Anesthesia Post Evaluation    Patient: Susannah Xie    Procedure(s) Performed: Procedure(s) (LRB):  COLECTOMY, WITH COLOANAL ANASTOMOSIS (N/A)  CREATION, ILEOSTOMY (N/A)  RESECTION, COLON, LOW ANTERIOR  MOBILIZATION, SPLENIC FLEXURE  WRAP-OMENTAL    Final Anesthesia Type: general  Patient location during evaluation: PACU  Patient participation: Yes- Able to Participate  Level of consciousness: awake and alert and oriented  Post-procedure vital signs: reviewed and stable  Pain management: adequate  Airway patency: patent  PONV status at discharge: No PONV  Anesthetic complications: no      Cardiovascular status: hemodynamically stable  Respiratory status: unassisted and spontaneous ventilation  Hydration status: euvolemic  Follow-up not needed.        Visit Vitals  /60   Pulse 79   Temp 36.6 °C (97.8 °F)   Resp 16   Ht 5' 7" (1.702 m)   Wt 52.6 kg (116 lb)   SpO2 95%   Breastfeeding? No   BMI 18.17 kg/m²       Pain/Kiran Score: Pain Rating Prior to Med Admin: 8 (12/21/2018  8:36 AM)  Ikran Score: 9 (12/20/2018  8:11 PM)        "

## 2018-12-21 NOTE — PLAN OF CARE
12/21/18 1111   Post-Acute Status   Post-Acute Authorization Home Health/Hospice   SW following for d/c needs. Pt expected to d/c home with home health.  SW will send a referral when appropriate.         Tracey Anderson, MSW, [facility]  [facility]  [id number]

## 2018-12-21 NOTE — PLAN OF CARE
Problem: Occupational Therapy Goal  Goal: Occupational Therapy Goal  Outcome: Outcome(s) achieved Date Met: 12/21/18  Pt presents with no functional impairments that would affect her ability to complete her self care tasks and functional mobility on this date. No further skilled OT needed in this setting.       Comments: Mone Harding OTR/L

## 2018-12-21 NOTE — PROGRESS NOTES
[facility]  Colorectal Surgery  Progress Note    Patient Name: Susannah Xie  MRN: [id number]  Admission Date: 12/20/2018  Hospital Length of Stay: 1 days  Attending Physician: JADA Benavides MD    Subjective:     Interval History: no acute events overnight. Some belching but denies n/v. Pain well controlled.    Post-Op Info:  Procedure(s) (LRB):  COLECTOMY, WITH COLOANAL ANASTOMOSIS (N/A)  CREATION, ILEOSTOMY (N/A)  RESECTION, COLON, LOW ANTERIOR  MOBILIZATION, SPLENIC FLEXURE  WRAP-OMENTAL   1 Day Post-Op      Medications:  Continuous Infusions:   sodium chloride 0.9% 70 mL/hr at 12/20/18 1047    sodium chloride 0.9% 100 mL/hr at 12/21/18 0335     Scheduled Meds:   ibuprofen  800 mg Intravenous Q8H    lidocaine (PF) 10 mg/ml (1%)  1 mL Intradermal Once    mupirocin  1 g Nasal BID     PRN Meds:   metoclopramide HCl    naloxone    ondansetron    oxyCODONE    oxyCODONE    sodium chloride 0.9%        Objective:     Vital Signs (Most Recent):  Temp: 97.7 °F (36.5 °C) (12/21/18 0425)  Pulse: 77 (12/21/18 0425)  Resp: 17 (12/21/18 0425)  BP: (!) 164/71 (12/21/18 0425)  SpO2: 99 % (12/21/18 0425) Vital Signs (24h Range):  Temp:  [97.2 °F (36.2 °C)-98.3 °F (36.8 °C)] 97.7 °F (36.5 °C)  Pulse:  [66-97] 77  Resp:  [12-20] 17  SpO2:  [95 %-100 %] 99 %  BP: (127-194)/(56-97) 164/71     Intake/Output - Last 3 Shifts       12/19 0700 - 12/20 0659 12/20 0700 - 12/21 0659 12/21 0700 - 12/22 0659    P.O.  120     I.V. (mL/kg)  3600 (68.4)     Total Intake(mL/kg)  3720 (70.7)     Urine (mL/kg/hr)  1170     Stool  0     Blood  300     Total Output  1470     Net  +2250            Stool Occurrence  0 x           Physical Exam   Constitutional: She is oriented to person, place, and time. She appears well-developed and well-nourished. No distress.   HENT:   Head: Normocephalic and atraumatic.   Eyes: Pupils are equal, round, and reactive to light. No scleral icterus.   Neck: Normal range of motion. Neck  supple.   Cardiovascular: Normal rate and regular rhythm.   Pulmonary/Chest: Effort normal. No respiratory distress.   Abdominal: Soft. She exhibits no distension and no mass. There is tenderness (incisional). There is no guarding.   Incision c/d/i  Stoma pink/viable, small amount of liquid stool   Neurological: She is alert and oriented to person, place, and time.   Skin: Skin is warm and dry. She is not diaphoretic.   Psychiatric: She has a normal mood and affect.     Significant Labs:  BMP (Last 3 Results): No results for input(s): GLU, NA, K, CL, CO2, BUN, CREATININE, CALCIUM, MG in the last 168 hours.  CBC (Last 3 Results): No results for input(s): WBC, RBC, HGB, HCT, PLT, MCV, MCH, MCHC in the last 168 hours.    Significant Diagnostics:  I have reviewed all pertinent imaging results/findings within the past 24 hours.    Assessment/Plan:     Rectal cancer    1 Day Post-Op low anterior/coloanal with DLI, doing well    Multimodal pain control  OOB/ambulate, PT/OT  Clear liquids + IVF  Rdz until day 2  Stoma teaching  Incentive spirometry, patient reminded to use 6x/hour  Home meds  DVT ppx - lovenox       COPD (chronic obstructive pulmonary disease)    Continue home breathing treatments           Oscar Ngo MD  Colorectal Surgery  [facility]

## 2018-12-22 LAB
ANION GAP SERPL CALC-SCNC: 6 MMOL/L
BASOPHILS # BLD AUTO: 0.02 K/UL
BASOPHILS NFR BLD: 0.3 %
BUN SERPL-MCNC: 3 MG/DL
CALCIUM SERPL-MCNC: 8.1 MG/DL
CHLORIDE SERPL-SCNC: 106 MMOL/L
CO2 SERPL-SCNC: 27 MMOL/L
CREAT SERPL-MCNC: 0.5 MG/DL
DIFFERENTIAL METHOD: ABNORMAL
EOSINOPHIL # BLD AUTO: 0.2 K/UL
EOSINOPHIL NFR BLD: 2.7 %
ERYTHROCYTE [DISTWIDTH] IN BLOOD BY AUTOMATED COUNT: 14.6 %
EST. GFR  (AFRICAN AMERICAN): >60 ML/MIN/1.73 M^2
EST. GFR  (NON AFRICAN AMERICAN): >60 ML/MIN/1.73 M^2
GLUCOSE SERPL-MCNC: 114 MG/DL
HCT VFR BLD AUTO: 31.7 %
HGB BLD-MCNC: 10.4 G/DL
IMM GRANULOCYTES # BLD AUTO: 0.02 K/UL
IMM GRANULOCYTES NFR BLD AUTO: 0.3 %
LYMPHOCYTES # BLD AUTO: 0.3 K/UL
LYMPHOCYTES NFR BLD: 4.7 %
MAGNESIUM SERPL-MCNC: 1.4 MG/DL
MCH RBC QN AUTO: 30.5 PG
MCHC RBC AUTO-ENTMCNC: 32.8 G/DL
MCV RBC AUTO: 93 FL
MONOCYTES # BLD AUTO: 0.3 K/UL
MONOCYTES NFR BLD: 4.4 %
NEUTROPHILS # BLD AUTO: 6.1 K/UL
NEUTROPHILS NFR BLD: 87.6 %
NRBC BLD-RTO: 0 /100 WBC
PHOSPHATE SERPL-MCNC: 1.9 MG/DL
PLATELET # BLD AUTO: 233 K/UL
PMV BLD AUTO: 11.2 FL
POTASSIUM SERPL-SCNC: 3 MMOL/L
RBC # BLD AUTO: 3.41 M/UL
SODIUM SERPL-SCNC: 139 MMOL/L
WBC # BLD AUTO: 6.97 K/UL

## 2018-12-22 PROCEDURE — 25000003 PHARM REV CODE 250: Performed by: NURSE PRACTITIONER

## 2018-12-22 PROCEDURE — 25000003 PHARM REV CODE 250: Performed by: STUDENT IN AN ORGANIZED HEALTH CARE EDUCATION/TRAINING PROGRAM

## 2018-12-22 PROCEDURE — 83735 ASSAY OF MAGNESIUM: CPT

## 2018-12-22 PROCEDURE — 36415 COLL VENOUS BLD VENIPUNCTURE: CPT

## 2018-12-22 PROCEDURE — 80048 BASIC METABOLIC PNL TOTAL CA: CPT

## 2018-12-22 PROCEDURE — 20600001 HC STEP DOWN PRIVATE ROOM

## 2018-12-22 PROCEDURE — 85025 COMPLETE CBC W/AUTO DIFF WBC: CPT

## 2018-12-22 PROCEDURE — 84100 ASSAY OF PHOSPHORUS: CPT

## 2018-12-22 PROCEDURE — 63600175 PHARM REV CODE 636 W HCPCS: Performed by: STUDENT IN AN ORGANIZED HEALTH CARE EDUCATION/TRAINING PROGRAM

## 2018-12-22 RX ORDER — POTASSIUM CHLORIDE 20 MEQ/1
60 TABLET, EXTENDED RELEASE ORAL ONCE
Status: COMPLETED | OUTPATIENT
Start: 2018-12-22 | End: 2018-12-22

## 2018-12-22 RX ORDER — IBUPROFEN 400 MG/1
800 TABLET ORAL EVERY 8 HOURS
Status: DISCONTINUED | OUTPATIENT
Start: 2018-12-22 | End: 2018-12-23 | Stop reason: HOSPADM

## 2018-12-22 RX ADMIN — MUPIROCIN 1 G: 20 OINTMENT TOPICAL at 09:12

## 2018-12-22 RX ADMIN — PANTOPRAZOLE SODIUM 40 MG: 40 TABLET, DELAYED RELEASE ORAL at 08:12

## 2018-12-22 RX ADMIN — OXYCODONE HYDROCHLORIDE 10 MG: 10 TABLET ORAL at 05:12

## 2018-12-22 RX ADMIN — IBUPROFEN 800 MG: 800 INJECTION INTRAVENOUS at 03:12

## 2018-12-22 RX ADMIN — POTASSIUM CHLORIDE 60 MEQ: 1500 TABLET, EXTENDED RELEASE ORAL at 09:12

## 2018-12-22 RX ADMIN — IBUPROFEN 800 MG: 800 INJECTION INTRAVENOUS at 06:12

## 2018-12-22 RX ADMIN — IBUPROFEN 800 MG: 400 TABLET, FILM COATED ORAL at 09:12

## 2018-12-22 RX ADMIN — SIMETHICONE CHEW TAB 80 MG 80 MG: 80 TABLET ORAL at 03:12

## 2018-12-22 RX ADMIN — MUPIROCIN 1 G: 20 OINTMENT TOPICAL at 08:12

## 2018-12-22 RX ADMIN — SIMETHICONE CHEW TAB 80 MG 80 MG: 80 TABLET ORAL at 08:12

## 2018-12-22 RX ADMIN — OXYCODONE HYDROCHLORIDE 10 MG: 10 TABLET ORAL at 03:12

## 2018-12-22 RX ADMIN — OXYCODONE HYDROCHLORIDE 10 MG: 10 TABLET ORAL at 09:12

## 2018-12-22 RX ADMIN — SIMETHICONE CHEW TAB 80 MG 80 MG: 80 TABLET ORAL at 06:12

## 2018-12-22 RX ADMIN — OXYCODONE HYDROCHLORIDE 10 MG: 10 TABLET ORAL at 08:12

## 2018-12-22 NOTE — OP NOTE
Date of procedure:   December 20, 2018    Indications for procedure:  63-year-old female with locally advanced low rectal cancer.  She is status post neoadjuvant chemotherapy and radiation therapy with excellent clinical response.  Post treatment MRI revealed persistently enlarged mesorectal lymph nodes adjacent to the mesorectal fascia.  On physical examination the palpable mass was persistent primarily posteriorly approximately 1 cm above the anorectal ring.  I have recommended to the patient and her family that she undergo open ultra-low anterior resection with colonic J-pouch anal anastomosis and diverting loop ileostomy.  The details of the procedure, expected hospital course and recuperation, functional consequences and the need for temporary stoma creation were discussed explicitly.  Need for possible permanent stoma creation was also discussed.    Preoperative diagnosis:   Low rectal cancer, locally invasive    Postoperative diagnosis:  Same    Name of procedure:  Ultra-low anterior resection, intersphincteric proctectomy with colonic J-pouch anal anastomosis, complete splenic flexure mobilization, pedicled omental graft to pelvis, diverting loop ileostomy.    Surgeon:   JADA Benavides MD    Assistant surgeon:  Oscar Ngo MD    Type of anesthesia:  General endotracheal    EBL:  200  Cc's    Drains:  None    Specimen:  Intersphincteric proctectomy with portion of anal canal, rectum and sigmoid colon.    Findings:  1.  Low rectal carcinoma 2-1/2 cm from distal resection margin.   2.  No evidence of distant metastasis  3.  Initial posterior margin was abutting but not involving the resection margin.  I excised the aponeurosis layer of the levator muscles, pelvic floor, and this layer represented the true posterior margin and was noted on fresh frozen sectioning to be negative  4.  Hand-sewn anastomosis was performed between the colonic pouch and the anal canal at the level of the dentate  line.    Technique in detail:  The patient was brought to the operating room positive identified and placed on the operating table in supine position with sequential compression devices on her lower extremities.  The patient had undergone an outpatient will mechanical and oral antibiotic bowel preparation.  She received intravenous antibiotics and subcutaneous a quiz heparin.  She underwent general endotracheal anesthesia without complication.  A Rdz catheter and orogastric tube were inserted. She was positioned in the modified lithotomy position and padded Yellofin stirrups.  All pressure points were padded.  Her left arm was tucked at her side.  Stoma site had been marked previously and was scratched.    Critical time-out was performed.      We performed a distal rectal washout.  Betadine was skilled into the rectum following this.  Her abdomen and perineum were prepared and draped in usual sterile fashion.  The patient was explored through a long midline incision extending from the epigastric area through the umbilicus to the symphysis pubis.  The abdomen was entered with care being taken to avoid injury to the underlying viscera.  Upon opening the abdomen there was no evidence of ascites or peritoneal implants.  The abdomen is explored.  There is no evidence of distant metastatic disease.  Uterus was surgically absent.  There was a palpable low rectal mass below the peritoneal reflection anteriorly.    The small bowel and transverse colon were packed into the upper abdomen.  Bowel for retractor was employed for exposure.  The sigmoid colon and left colon were mobilized lateral to medial fashion off the retroperitoneum.  The left gonadal vessel left ureter were preserved from harm.  The mesentery was mobilized to the aorta.  The inferior mesenteric artery was then carefully skeletonized at its takeoff from the aorta clamped divided and suture ligated.  Then we divided the ascending branch of left colic artery  and the inferior mesenteric vein between clamps and ties.  The mesentery was then divided out to the descending colon sigmoid colon junction where I skeletonize the marginal artery of Wei.  The vessel was divided and allowed to bleed freely.  Pulsatile arterial blood flow was noted the vessel was ligated.  The bowel was transected at the distal descending colon using the echelon 60 stapler with a green cartridge.  The left colon was then packed into the upper abdomen and the sigmoid colon was used as a handle.      Total mesorectal excision was undertaken.  The divided inferior mesenteric artery was used as a guide.  The dissection was carried posteriorly just behind the vessel using this as a guide onto the posterior aspect of the mesorectum.  Lighted Phyllis retractors were employed for exposure.  I scored the retroperitoneum on either side of the mesorectum into the pelvis and anteriorly peritoneum was divided at the cul-de-sac between the rectum and vagina.  Mesorectal excision was then performed sharply under direct vision posteriorly down to the level of the levator plate and anteriorly with care being taken to avoid injury to the posterior wall of the vagina.  Lastly the lateral aspects were performed 1st on the right and the left. The tumor was primarily in the posterior aspect of the dissection. I was able to sharply dissect all the way down to the levator hiatus under direct vision. Hemostasis was assured. We covered the abdominal contents with moist sponges and with the 3/4 draped.    We went to the perineum and placed the patient in high lithotomy position. The perineum was re-prepped.  Headlights were used for illumination.  [place] was retractor was inserted on the distal aspect of the anal canal proximal to the intersphincteric groove.  This everted the anus exposing the dentate line. A lighted Jaye Eaton retractor was used for additional exposure.  The dentate line was scored with the Malathi  and deepened through the mucosa submucosa and internal anal sphincter muscle.  Intersphincteric proctectomy was undertaken sharply using scissors dissecting circumferentially to connect to the pelvic dissection. After dissection for approximately 1 cm the specimen side was closed with continuous 2 0 Vicryl to close the specimen and avoid contamination.  Intersphincteric proctectomy was then performed under direct vision using lighted lateral retractors for exposure as well as the Jaye Eaton retractor. The specimen was then removed from the perineum.  The specimen was hand carried to Surgical pathology where frozen section was performed posterior aspect of the mesorectum.  I viewed these personally and the margin was noted to be negative but close.  I went back to the operating room and excised the fascial layer overlying the levator muscle itself and this was sent with the posterior aspect of this representing the true circumferential resection margin.  Repeat frozen sectioning of this layer prove negative for any carcinoma or mucinous change.    The pelvis was irrigated with copious amounts of saline solution and then sterile water.  Hemostasis was assured.    The splenic flexure was completely mobilized back to the middle colic vessels.  The lesser sac was entered and the omentum was dissected completely off of the transverse colon.  The inferior mesenteric vein was divided at the base of the pancreas between clamps and ties of Vicryl. Relaxing incision was made along the mesentery of the splenic flexure to completely straighten the mesentery.    A colonic J-pouch was then fashion with limbs measuring 8 cm.  A colotomy was created at the base of the pouch and the pouch was created by firing the echelon stapler with green cartridges along the along the anti mesenteric border.  Hemostasis was assured.  Orienting sutures were placed at the apex of the pouch and the pouch was passed down to the perineal .   He grasped the sutures with a ring clamp and the pouch was delivered to the perineum easily without tension and with care being taken to properly orient the mesentery. I then went to the perineum and hand-sewn anastomosis was performed using interrupted Vicryl sutures. Two 0 Vicryl sutures were placed at the ordered points and 3 0 Vicryl sutures were placed at intervening points with a total of 12 sutures being placed.  The anastomosis was palpated and noted to be intact and there were no gaps.  I inspected this with a lighted Ovalle retractors well. Hemostasis was assured.    I changed gown and gloves.  The abdomen was explored and hemostasis noted. Pedicled omental graft was brought along the left colic gutter and placed into the pelvis covering the colonic pouch and placed between the posterior wall of vagina and the pouch. The small bowel was placed anterior to the left colon and the omentum.  A transversus abdominis plane block was performed    A separate closing tray was employed for closure. We changed gown and gloves.  Stoma aperture was created at the premarked site and dilated to 2 finger breaths.  A loop of ileum was covered with Seprafilm and brought up through the aperture with care being taken to properly orient the mesentery.   All sponge needle instrument counts were noted be correct. Linea alba was reapproximated using continuous looped heavy PDS suture. Subcutaneous tissues were irrigated with 2 L of saline.  The skin was approximated with subcuticular Monocryl and skin glue.    Stoma was matured using interrupted 3 0 Vicryl with a dominant everted proximal end.  The distal nonfunctional and was matured flush to the skin. Stoma appliance was applied.  The patient was returned to the supine position, awake from anesthesia and returned to the recovery area in stable condition.    I was scrubbed and present for the entire operation.

## 2018-12-22 NOTE — PROGRESS NOTES
[facility]  Colorectal Surgery  Progress Note    Patient Name: Susannah Xie  MRN: [id number]  Admission Date: 12/20/2018  Hospital Length of Stay: 2 days  Attending Physician: JADA Benavides MD    Subjective:     Interval History: No acute events overnight.  Tolerating LRD. 1st half of stoma training today. Pain well controlled, some abdominal soreness. No nausea or vomiting. Rdz out this morning, has voided twice since removal. Good stoma output out of ileostomy.     Post-Op Info:  Procedure(s) (LRB):  COLECTOMY, WITH COLOANAL ANASTOMOSIS (N/A)  CREATION, ILEOSTOMY (N/A)  RESECTION, COLON, LOW ANTERIOR  MOBILIZATION, SPLENIC FLEXURE  WRAP-OMENTAL   2 Days Post-Op      Medications:  Continuous Infusions:   dextrose 5 % and 0.2 % NaCl with KCl 20 mEq 100 mL/hr at 12/21/18 2126     Scheduled Meds:   ibuprofen  800 mg Intravenous Q8H    lidocaine (PF) 10 mg/ml (1%)  1 mL Intradermal Once    mupirocin  1 g Nasal BID    pantoprazole  40 mg Oral Daily     PRN Meds:   metoclopramide HCl    naloxone    ondansetron    oxyCODONE    oxyCODONE    simethicone    sodium chloride 0.9%        Objective:     Vital Signs (Most Recent):  Temp: 98.1 °F (36.7 °C) (12/22/18 0400)  Pulse: 72 (12/22/18 0400)  Resp: 18 (12/22/18 0400)  BP: 138/72 (12/22/18 0400)  SpO2: 100 % (12/22/18 0400) Vital Signs (24h Range):  Temp:  [97.3 °F (36.3 °C)-98.3 °F (36.8 °C)] 98.1 °F (36.7 °C)  Pulse:  [72-84] 72  Resp:  [16-20] 18  SpO2:  [95 %-100 %] 100 %  BP: (121-167)/(58-73) 138/72     Intake/Output - Last 3 Shifts       12/20 0700 - 12/21 0659 12/21 0700 - 12/22 0659 12/22 0700 - 12/23 0659    P.O. 120 375     I.V. (mL/kg) 3600 (68.4) 800 (15.2)     Total Intake(mL/kg) 3720 (70.7) 1175 (22.3)     Urine (mL/kg/hr) 1170 3600 (2.9)     Stool 0 350     Blood 300      Total Output 1470 3950     Net +2250 -2775            Stool Occurrence 0 x            Physical Exam   Constitutional: She is oriented to person, place, and  time. She appears well-developed and well-nourished. No distress.   HENT:   Head: Normocephalic and atraumatic.   Eyes: Pupils are equal, round, and reactive to light. No scleral icterus.   Neck: Normal range of motion. Neck supple.   Cardiovascular: Normal rate and regular rhythm.   Pulmonary/Chest: Effort normal. No respiratory distress.   Abdominal: Soft. She exhibits no distension and no mass. There is tenderness (incisional). There is no guarding.   Incision c/d/i  Stoma pink/viable, liquid stool in ostomy bag   Neurological: She is alert and oriented to person, place, and time.   Skin: Skin is warm and dry. She is not diaphoretic.   Psychiatric: She has a normal mood and affect.       Significant Labs:  CBC (Last 3 Results):   Recent Labs   Lab 12/21/18  0501 12/22/18  0448   WBC 6.74 6.97   RBC 3.83* 3.41*   HGB 11.4* 10.4*   HCT 36.0* 31.7*    233   MCV 94 93   MCH 29.8 30.5   MCHC 31.7* 32.8     CMP (Last 3 Results):   Recent Labs   Lab 12/21/18  0501 12/21/18  1601 12/22/18  0448   * 153* 114*   CALCIUM 7.5* 8.1* 8.1*    136 139   K 2.6* 3.3* 3.0*   CO2 23 25 27    105 106   BUN 6* 4* 3*   CREATININE 0.5 0.6 0.5       Significant Diagnostics:  None    Assessment/Plan:     * Rectal cancer    2 Days Post-Op low anterior/coloanal with DLI, doing well    Multimodal pain control  OOB/ambulate, PT/OT  Diet as tolerated, hep lock fluids  Rdz out, voiding  Stoma teaching  Incentive spirometry, patient reminded to use 6x/hour  Home meds  DVT ppx - lovenox   Dispo: likely home tomorrow following stoma teaching       COPD (chronic obstructive pulmonary disease)    Continue home breathing treatments           Oscar Ngo MD  Colorectal Surgery  [facility]

## 2018-12-22 NOTE — SUBJECTIVE & OBJECTIVE
Subjective:     Interval History: No acute events overnight.  Tolerating LRD. 1st half of stoma training today. Pain well controlled, some abdominal soreness. No nausea or vomiting. Rdz out this morning, has voided twice since removal. Good stoma output out of ileostomy.     Post-Op Info:  Procedure(s) (LRB):  COLECTOMY, WITH COLOANAL ANASTOMOSIS (N/A)  CREATION, ILEOSTOMY (N/A)  RESECTION, COLON, LOW ANTERIOR  MOBILIZATION, SPLENIC FLEXURE  WRAP-OMENTAL   2 Days Post-Op      Medications:  Continuous Infusions:   dextrose 5 % and 0.2 % NaCl with KCl 20 mEq 100 mL/hr at 12/21/18 2126     Scheduled Meds:   ibuprofen  800 mg Intravenous Q8H    lidocaine (PF) 10 mg/ml (1%)  1 mL Intradermal Once    mupirocin  1 g Nasal BID    pantoprazole  40 mg Oral Daily     PRN Meds:   metoclopramide HCl    naloxone    ondansetron    oxyCODONE    oxyCODONE    simethicone    sodium chloride 0.9%        Objective:     Vital Signs (Most Recent):  Temp: 98.1 °F (36.7 °C) (12/22/18 0400)  Pulse: 72 (12/22/18 0400)  Resp: 18 (12/22/18 0400)  BP: 138/72 (12/22/18 0400)  SpO2: 100 % (12/22/18 0400) Vital Signs (24h Range):  Temp:  [97.3 °F (36.3 °C)-98.3 °F (36.8 °C)] 98.1 °F (36.7 °C)  Pulse:  [72-84] 72  Resp:  [16-20] 18  SpO2:  [95 %-100 %] 100 %  BP: (121-167)/(58-73) 138/72     Intake/Output - Last 3 Shifts       12/20 0700 - 12/21 0659 12/21 0700 - 12/22 0659 12/22 0700 - 12/23 0659    P.O. 120 375     I.V. (mL/kg) 3600 (68.4) 800 (15.2)     Total Intake(mL/kg) 3720 (70.7) 1175 (22.3)     Urine (mL/kg/hr) 1170 3600 (2.9)     Stool 0 350     Blood 300      Total Output 1470 3950     Net +2250 -2775            Stool Occurrence 0 x            Physical Exam   Constitutional: She is oriented to person, place, and time. She appears well-developed and well-nourished. No distress.   HENT:   Head: Normocephalic and atraumatic.   Eyes: Pupils are equal, round, and reactive to light. No scleral icterus.   Neck: Normal range of  motion. Neck supple.   Cardiovascular: Normal rate and regular rhythm.   Pulmonary/Chest: Effort normal. No respiratory distress.   Abdominal: Soft. She exhibits no distension and no mass. There is tenderness (incisional). There is no guarding.   Incision c/d/i  Stoma pink/viable, liquid stool in ostomy bag   Neurological: She is alert and oriented to person, place, and time.   Skin: Skin is warm and dry. She is not diaphoretic.   Psychiatric: She has a normal mood and affect.       Significant Labs:  CBC (Last 3 Results):   Recent Labs   Lab 12/21/18  0501 12/22/18  0448   WBC 6.74 6.97   RBC 3.83* 3.41*   HGB 11.4* 10.4*   HCT 36.0* 31.7*    233   MCV 94 93   MCH 29.8 30.5   MCHC 31.7* 32.8     CMP (Last 3 Results):   Recent Labs   Lab 12/21/18  0501 12/21/18  1601 12/22/18  0448   * 153* 114*   CALCIUM 7.5* 8.1* 8.1*    136 139   K 2.6* 3.3* 3.0*   CO2 23 25 27    105 106   BUN 6* 4* 3*   CREATININE 0.5 0.6 0.5       Significant Diagnostics:  None

## 2018-12-22 NOTE — PLAN OF CARE
no events overnight. pt pain controlled with prn meds. pt AAOx4, VSS, no acute distress noted. daughter at bedside, instructed to call for assistance if needed, call light in reach. will continue to jose d.

## 2018-12-22 NOTE — PLAN OF CARE
Problem: Adult Inpatient Plan of Care  Goal: Plan of Care Review  Pt AOx4. Pain controlled with PRN PO meds, good appetite, pt ate 75% of all meals. Urinates with no problem after genao removal, Ambulated x3 in hallway, good iliostomy output. Will be DC tomorrow.

## 2018-12-22 NOTE — ASSESSMENT & PLAN NOTE
2 Days Post-Op low anterior/coloanal with DLI, doing well    Multimodal pain control  OOB/ambulate, PT/OT  Diet as tolerated, hep lock fluids  Rdz out, voiding  Stoma teaching  Incentive spirometry, patient reminded to use 6x/hour  Home meds  DVT ppx - lovenox   Dispo: likely home tomorrow following stoma teaching

## 2018-12-22 NOTE — PROGRESS NOTES
Ambulated hallways x 2 today, and up in chair for 6 hours.  Pain controlled. K and Mg replaced IV.  Kendell francoer to be d/c'd tomorrow morning.  Will cont to monitor.

## 2018-12-22 NOTE — NURSING
Met with patient for ostomy training lesson #2. Patient pouch intact.     Discussed stoma and davonte-stomal care.  Demonstrated cutting pouch to fit stoma allowing 1/8 inch clearance.  Pt returned demonstration and applied pouch to practice stoma board without difficulty.  Reviewed reading materials answering all questions.    Patient demonstrated opening and closing pouch.     Long discussed about intake and output, with emphasis and hydration. Measuring cups provided. Ml/cc vs ounce discussed. Intake and output sheet provided and discussed.     Discussed each type of supply given to patient (ie: paste, powder, timothy) and when it would be appropriate to use.   Patient and daughter eager to learn. Asked appropriate questions.   Supplies at bedside as well as box of ostomy appliances.    Smoking cessation encouraged.     Pt verbalized understanding of when to alert MD of issues with stoma.  Discussed follow up plan of care and provided patient with contact name and numbers.  Will follow up with patient tomorrow.     Adrianne Beth RN [facility]   [phone]

## 2018-12-23 VITALS
HEART RATE: 86 BPM | OXYGEN SATURATION: 96 % | HEIGHT: 67 IN | SYSTOLIC BLOOD PRESSURE: 138 MMHG | DIASTOLIC BLOOD PRESSURE: 75 MMHG | RESPIRATION RATE: 18 BRPM | TEMPERATURE: 98 F | WEIGHT: 116 LBS | BODY MASS INDEX: 18.21 KG/M2

## 2018-12-23 PROBLEM — E83.42 HYPOMAGNESEMIA: Status: RESOLVED | Noted: 2018-12-21 | Resolved: 2018-12-23

## 2018-12-23 PROBLEM — E87.6 HYPOKALEMIA: Status: RESOLVED | Noted: 2018-12-21 | Resolved: 2018-12-23

## 2018-12-23 LAB
ANION GAP SERPL CALC-SCNC: 9 MMOL/L
BASOPHILS # BLD AUTO: 0.01 K/UL
BASOPHILS NFR BLD: 0.2 %
BUN SERPL-MCNC: 4 MG/DL
CALCIUM SERPL-MCNC: 8.5 MG/DL
CHLORIDE SERPL-SCNC: 104 MMOL/L
CO2 SERPL-SCNC: 26 MMOL/L
CREAT SERPL-MCNC: 0.5 MG/DL
DIFFERENTIAL METHOD: ABNORMAL
EOSINOPHIL # BLD AUTO: 0.2 K/UL
EOSINOPHIL NFR BLD: 5.3 %
ERYTHROCYTE [DISTWIDTH] IN BLOOD BY AUTOMATED COUNT: 14.4 %
EST. GFR  (AFRICAN AMERICAN): >60 ML/MIN/1.73 M^2
EST. GFR  (NON AFRICAN AMERICAN): >60 ML/MIN/1.73 M^2
GLUCOSE SERPL-MCNC: 90 MG/DL
HCT VFR BLD AUTO: 32.4 %
HGB BLD-MCNC: 10.2 G/DL
IMM GRANULOCYTES # BLD AUTO: 0.01 K/UL
IMM GRANULOCYTES NFR BLD AUTO: 0.2 %
LYMPHOCYTES # BLD AUTO: 0.4 K/UL
LYMPHOCYTES NFR BLD: 8.6 %
MAGNESIUM SERPL-MCNC: 1.2 MG/DL
MCH RBC QN AUTO: 30.2 PG
MCHC RBC AUTO-ENTMCNC: 31.5 G/DL
MCV RBC AUTO: 96 FL
MONOCYTES # BLD AUTO: 0.2 K/UL
MONOCYTES NFR BLD: 4.9 %
NEUTROPHILS # BLD AUTO: 3.5 K/UL
NEUTROPHILS NFR BLD: 80.8 %
NRBC BLD-RTO: 0 /100 WBC
PHOSPHATE SERPL-MCNC: 2.4 MG/DL
PLATELET # BLD AUTO: 223 K/UL
PMV BLD AUTO: 11.7 FL
POTASSIUM SERPL-SCNC: 3.1 MMOL/L
RBC # BLD AUTO: 3.38 M/UL
SODIUM SERPL-SCNC: 139 MMOL/L
WBC # BLD AUTO: 4.31 K/UL

## 2018-12-23 PROCEDURE — 25000003 PHARM REV CODE 250: Performed by: NURSE PRACTITIONER

## 2018-12-23 PROCEDURE — 83735 ASSAY OF MAGNESIUM: CPT

## 2018-12-23 PROCEDURE — 25000003 PHARM REV CODE 250: Performed by: STUDENT IN AN ORGANIZED HEALTH CARE EDUCATION/TRAINING PROGRAM

## 2018-12-23 PROCEDURE — 85025 COMPLETE CBC W/AUTO DIFF WBC: CPT

## 2018-12-23 PROCEDURE — 36415 COLL VENOUS BLD VENIPUNCTURE: CPT

## 2018-12-23 PROCEDURE — 84100 ASSAY OF PHOSPHORUS: CPT

## 2018-12-23 PROCEDURE — 80048 BASIC METABOLIC PNL TOTAL CA: CPT

## 2018-12-23 PROCEDURE — 27000192 HC POUCH, WOUND DRAINAGE COLLECTOR (ANY SIZE)

## 2018-12-23 RX ORDER — OXYCODONE HYDROCHLORIDE 5 MG/1
5 TABLET ORAL EVERY 4 HOURS PRN
Qty: 40 TABLET | Refills: 0 | Status: SHIPPED | OUTPATIENT
Start: 2018-12-23

## 2018-12-23 RX ORDER — HYDRALAZINE HYDROCHLORIDE 25 MG/1
25 TABLET, FILM COATED ORAL EVERY 8 HOURS PRN
Status: DISCONTINUED | OUTPATIENT
Start: 2018-12-23 | End: 2018-12-23 | Stop reason: HOSPADM

## 2018-12-23 RX ADMIN — OXYCODONE HYDROCHLORIDE 10 MG: 10 TABLET ORAL at 12:12

## 2018-12-23 RX ADMIN — SIMETHICONE CHEW TAB 80 MG 80 MG: 80 TABLET ORAL at 12:12

## 2018-12-23 RX ADMIN — SIMETHICONE CHEW TAB 80 MG 80 MG: 80 TABLET ORAL at 05:12

## 2018-12-23 RX ADMIN — OXYCODONE HYDROCHLORIDE 10 MG: 10 TABLET ORAL at 05:12

## 2018-12-23 RX ADMIN — PANTOPRAZOLE SODIUM 40 MG: 40 TABLET, DELAYED RELEASE ORAL at 09:12

## 2018-12-23 RX ADMIN — IBUPROFEN 800 MG: 400 TABLET, FILM COATED ORAL at 05:12

## 2018-12-23 RX ADMIN — MUPIROCIN 1 G: 20 OINTMENT TOPICAL at 09:12

## 2018-12-23 RX ADMIN — OXYCODONE HYDROCHLORIDE 10 MG: 10 TABLET ORAL at 09:12

## 2018-12-23 NOTE — PROGRESS NOTES
Met with patient for ostomy training lesson #2.  Patient removed ostomy pouch and discussed stoma and davonte-stomal care.  Patient cut pouch to fit stoma allowing 1/8 inch clearance and applied pouch to stoma site without difficulty.  Reviewed reading materials answering all questions.  Pt and daughter verbalized understanding of when to alert MD of issues with stoma.  Discussed follow up plan of care and provided patient with contact name and numbers.  Supplies at bedside, information given for OSTO group.       12/23/18 0915       Ileostomy 12/20/18 2015 RLQ   Placement Date/Time: 12/20/18 2015   Present Prior to Hospital Arrival?: No  Inserted by: MD  Location: RLQ   Stoma Appearance rosebud appearance;red;moist   Stoma Size (in) 27   Appliance 1-piece;intact;no leakage;changed   Accessories/Skin Care cleansed w/ water;skin sealant   Treatment Bag change;Site care   Stoma Function flatus;stool;thin liquid;green   Peristomal Assessment Clean;Intact;Dry   Tolerance assisted w/ appliance change;assisted w/ stoma care   Output (mL) 50 mL

## 2018-12-23 NOTE — PROGRESS NOTES
Discharge instructions reviewed. Prescriptions reviewed and given to patient. Pt . Verbalized understanding. All questions answered. PIV d'c'd with cath tip intact and discarded. Ileostomy bag changed.  Pt currently awaiting transport.

## 2018-12-23 NOTE — PLAN OF CARE
Problem: Adult Inpatient Plan of Care  Goal: Plan of Care Review  Outcome: Ongoing (interventions implemented as appropriate)  POC reviewed with patient and verbalizes  Understanding. AAOx4. VSS on RA. ML with DB well approximated. RLQ ileostomy has brown output. No c/o of nausea. Pain relieved with PRN pain meds. Bed low and locked. No falls or acute events. Call light within reach. WCTM.

## 2018-12-23 NOTE — DISCHARGE SUMMARY
[facility]  DISCHARGE SUMMARY  General Surgery      Admit Date:  12/20/2018    Discharge Date and Time:  12/23/2018  12:00 PM    Attending Physician:  JADA Benavides MD     Discharge Provider:  Radha Mccarthy MD     Reason for Admission:  Rectal cancer     Procedures Performed:  Procedure(s) (LRB):  COLECTOMY, WITH COLOANAL ANASTOMOSIS (N/A)  CREATION, ILEOSTOMY (N/A)  RESECTION, COLON, LOW ANTERIOR  MOBILIZATION, SPLENIC FLEXURE  WRAP-OMENTAL    Hospital Course:  Please see the preoperative H&P and other available documentation for full details related to history prior to this admission.  Briefly, Susannah Xie is a 63 y.o. female who was admitted following scheduled elective surgery for Rectal cancer    Following a complete preoperative discussion of the risks and benefits of surgery with signed informed consent, the patient was taken to the operating room on 12/20/2018 and underwent the above stated procedures.  The patient tolerated surgery well and there were no complications.  Please see the operative report for full intraoperative findings and details.  Postoperatively, the patient did well and was transferred from the PACU to the floor in stable condition where they had a stable and uncomplicated hospital course.  Labs and vital signs remained stable and appropriate throughout course.  Diet was advanced as tolerated and the patient's pain was controlled on oral pain medications without problem.  Currently, the patient is doing well at 3 Days Post-Op and is stable and appropriate for discharge home at this time.      Consults:  None.    Significant Diagnostic Studies:   Recent Labs   Lab 12/21/18  0501 12/22/18  0448 12/23/18  0544   WBC 6.74 6.97 4.31   HGB 11.4* 10.4* 10.2*   HCT 36.0* 31.7* 32.4*    233 223     Recent Labs   Lab 12/21/18  0501 12/21/18  1601 12/22/18  0448 12/23/18  0544    136 139 139   K 2.6* 3.3* 3.0* 3.1*    105 106 104   CO2 23 25 27 26   BUN  6* 4* 3* 4*   CREATININE 0.5 0.6 0.5 0.5   * 153* 114* 90   CALCIUM 7.5* 8.1* 8.1* 8.5*   MG 1.2* 1.8 1.4* 1.2*   PHOS 3.7  --  1.9* 2.4*   No results for input(s): INR, PTT, LABHEPA, LACTATE, TROPONINI, CPK, CPKMB, MB, BNP in the last 72 hours.No results for input(s): PH, PCO2, PO2, HCO3 in the last 72 hours.     Physical Exam   Constitutional: She is oriented to person, place, and time. She appears well-developed.   Neck: No JVD present. No tracheal deviation present.   Cardiovascular: Normal rate and regular rhythm.   Pulmonary/Chest: Breath sounds normal. No respiratory distress.   Abdominal: Soft. She exhibits no distension and no mass. There is no tenderness. There is no rebound and no guarding.   Ostomy with liquid stool output   Musculoskeletal: She exhibits no edema.   Neurological: She is alert and oriented to person, place, and time.   Skin: Skin is warm and dry.           Final Diagnoses:   Principal Problem:  Rectal cancer   Secondary Diagnoses:    Active Hospital Problems    Diagnosis  POA    *Rectal cancer [C20]  Yes    Hypokalemia [E87.6]  No    Hypomagnesemia [E83.42]  No    COPD (chronic obstructive pulmonary disease) [J44.9]  Yes      Resolved Hospital Problems   No resolved problems to display.       Discharged Condition:  Good    Disposition:  Home or Self Care    Follow Up/Patient Instructions:     Medications:  Reconciled Home Medications:    Current Discharge Medication List      START taking these medications    Details   oxyCODONE (ROXICODONE) 5 MG immediate release tablet Take 1 tablet (5 mg total) by mouth every 4 (four) hours as needed for Pain.  Qty: 40 tablet, Refills: 0         CONTINUE these medications which have NOT CHANGED    Details   albuterol (VENTOLIN HFA) 90 mcg/actuation inhaler Inhale 2 puffs into the lungs 2 (two) times daily as needed for Wheezing. Rescue      Bifidobacterium infantis (ALIGN ORAL) Take 1 capsule by mouth every morning.      esomeprazole  (NEXIUM) 20 mg GrPS Take 20 mg by mouth 2 (two) times daily.      loperamide (IMODIUM A-D) 2 mg Tab Take 2 mg by mouth 4 (four) times daily as needed.      ibuprofen (ADVIL,MOTRIN) 200 MG tablet Take 200 mg by mouth every 6 (six) hours as needed.          STOP taking these medications       metroNIDAZOLE (FLAGYL) 500 MG tablet Comments:   Reason for Stopping:         neomycin (MYCIFRADIN) 500 mg Tab Comments:   Reason for Stopping:             Discharge Procedure Orders   Diet Adult Regular     No driving until:   Order Comments: While taking narcotic pain medications.     Notify your health care provider if you experience any of the following:  temperature >100.4     Notify your health care provider if you experience any of the following:  persistent nausea and vomiting or diarrhea     Notify your health care provider if you experience any of the following:  severe uncontrolled pain     Notify your health care provider if you experience any of the following:  redness, tenderness, or signs of infection (pain, swelling, redness, odor or green/yellow discharge around incision site)     No dressing needed     Activity as tolerated   Order Comments: - Ok to shower. No submersion of surgical wounds (bath, pool, hot tub, etc) for two weeks.  - No heavy lifting greater than 10 pounds.     Follow-up Information     H Jeyson Benavides MD In 2 weeks.    Specialty:  Colon and Rectal Surgery  Contact information:  [address]  [facility] [id number] [phone]                   Radha Mccarthy MD

## 2018-12-26 NOTE — PROGRESS NOTES
History of rectal cancer    Repeat colonoscopy in 1 year    If you have any questions or if I can clarify any of the above please contact me:    Mobile [phone]   Pager [phone]  email [email or web address]  EPIC message  Nurse Radha Perry [phone]   Mira Ruiz:  [phone]    Sincerely  H, Jeyson Benavides MD, FACS, FASCRS  Staff Surgeon  Dept of Colon and Rectal Surgery

## 2018-12-31 ENCOUNTER — TELEPHONE (OUTPATIENT)
Dept: SURGERY | Facility: CLINIC | Age: 63
End: 2018-12-31

## 2018-12-31 NOTE — TELEPHONE ENCOUNTER
----- Message from Shira Piedra sent at 12/31/2018  9:48 AM CST -----  Contact: self [phone]  Needs Advice    Reason for call: Pt called in regarding her surgery she had on 12/20. Pt states she is having a little bleeding         Communication Preference: self [phone]    Additional Information:

## 2018-12-31 NOTE — TELEPHONE ENCOUNTER
Spoke with patient. Reports scant blood tinged mucous from rectum.Denied  fever, pain, bleeding, N&V. Informed her OK to have mucous discharge from rectum and it may be blood tinged. Post op appointment scheduled and instructed to call clinic prior for any concerns.

## 2019-01-07 ENCOUNTER — NURSE TRIAGE (OUTPATIENT)
Dept: ADMINISTRATIVE | Facility: CLINIC | Age: 64
End: 2019-01-07

## 2019-01-07 NOTE — PHYSICIAN QUERY
PT Name: Susannah Xie  MR #: [id number]    Physician Query Form - Pathology Findings Clarification     CDS/: Samantha Rahman RN              Contact information:[email or web address]  This form is a permanent document in the medical record.     Query Date: January 7, 2019      By submitting this query, we are merely seeking further clarification of documentation.  Please utilize your independent clinical judgment when addressing the question(s) below.      The medical record contains the following:     Findings Supporting Clinical Information Location in Medical Record   18 definitive lymph nodes identified, 3 are positive for metastatic tumor (3/18)    Rectal Cancer with lymph node metastasis FINAL PATHOLOGIC DIAGNOSIS  1. Rectum and anal canal, ultra-low anterior resection (40.0 cm):  - Mucinous adenocarcinoma, see synoptic report  2. Suture on new radial margin:  - Smooth muscle and fibroadipose tissue, negative for carcinoma  SYNOPTIC REPORT  PROCEDURE: Ultra-low anterior resection  TUMOR SITE: Rectum  TUMOR LOCATION: Entirely below the anterior peritoneal reflection  TUMOR SIZE: 3.0 x 2.0 cm  MACROSCOPIC TUMOR PERFORATION: Not definitively identified  MACROSCOPIC INTACTNESS OF MESORECTUM: Grossly complete  HISTOLOGIC TYPE: Mucinous adenocarinoma  HISTOLOGIC GRADE: G2 (moderately differentiated)  TUMOR EXTENSION: Tumor invades through the muscularis propria into  pericolorectal tissue  MARGINS: Radial margin: Main specimen: less than 0.5 mm  Separately submitted specimen #2: Negative for carcinoma  Proximal margin: 34.5 cm  Distal margin: 2.5 cm  Mesenteric vascular margin: 14.5 cm  LYMPHOVASCULAR INVASION: Present  PERINEURAL INVASION: Present  TUMOR BUDDING: Not identified  TYPE OF POLYP IN WHICH CARCINOMA AROSE: None identified  TUMOR DEPOSITS*: 10 tumor deposits identified  REGIONAL LYMPH NODES*: 18 definitive lymph nodes identified, 3 are positive for metastatic tumor (3/18)  ANCILLARY STUDIES: MSI  panel has been ordered, results will be  issued in a supplemental report when available  PATHOLOGIC STAGE CLASSIFICATION*: pT3 pN1b  Tumor block: 1J  Normal tissue block: 1T  Tissue type: Colon  Frozen Section Diagnosis  1. Mucinous tumor < 1 mm from radial margin. TFS  2. Negative new radial margin. TFS    Rectal Cancer   Procedure(s) (LRB):  COLECTOMY, WITH COLOANAL ANASTOMOSIS (N/A)  CREATION, ILEOSTOMY (N/A)  RESECTION, COLON, LOW ANTERIOR  MOBILIZATION, SPLENIC FLEXURE  WRAP-OMENTAL  Post treatment MRI revealed persistently enlarged mesorectal lymph nodes adjacent to the mesorectal fascia.     Pathology results from 12/20                                                                                 OP note 12/20       Please document the clinical significance of the Pathologists findings of Rectal Cancer with lymph note metastasis .    [  X ] I agree with the Pathology Findings   [   ] I do not agree with the Pathology Findings   [   ] Other/Clarification of Findings:   [   ] Clinically Insignificant   [  ] Clinically Undetermined       Please document in your progress notes daily for the duration of treatment until resolved and include in your discharge summary.

## 2019-01-07 NOTE — TELEPHONE ENCOUNTER
Reason for Disposition   Side (flank) or lower back pain present    Protocols used: ST URINARY SYMPTOMS-A-AH    -vaginal area down extremities having pain, no pain near surgical site  -8/10 pain, took 1 pain pill prior to call   -after pain medication, pain normally reduces to a 4/10  -decreased urination (has urinated 4-5 times today but not a lot, dark urine)  -states she is drinking plenty of fluids (8-9 glasses of fluids per day -10 oz cup)

## 2019-01-09 NOTE — PHYSICIAN QUERY
PT Name: Susannah Xie  MR #: [id number]    Physician Query Form - Nutrition Clarification     CDS/: Samantha Rahman RN               Contact information: [email or web address]    This form is a permanent document in the medical record.     Query Date: January 9, 2019    By submitting this query, we are merely seeking further clarification of documentation.. Please utilize your independent clinical judgment when addressing the question(s) below.    The Medical record contains the following:   Indicators  Supporting Clinical Findings Location in Medical Record   x % of Estimated Energy Intake over a time frame from p.o., TF, or TPN No recorded intake  75% intake @1200  75% intake@0830 and 1300; 50% @1800 Intake/output record 12/20  Intake/output record 12/21  Intake/output record 12/22   x Weight Status over a time frame No fatigability or weight loss   H&P    Subcutaneous Fat and/or Muscle Loss      Fluid Accumulation or Edema      Reduced  Strength     x Wt / BMI / Usual Body Weight BMI 18.17 H&P    Delayed Wound Healing / Failure to Thrive     x Acute or Chronic Illness Rectal cancer, COPD, hypokalemia, hypomagnesemia    Rectal cancer with lymph node metastasis Discharge summary      Provider query response     Medication      Treatment      Other       AND / ASPEN Clinical Characteristics (October 2011)  A minimum of two characteristics is recommended for diagnosing either moderate or severe malnutrition   Mild Malnutrition Moderate Malnutrition Severe Malnutrition   Energy Intake from p.o., TF or TPN. < 75% intake of estimated energy needs for less than 7 days < 75% intake of estimated energy needs for greater than 7 days < 50% intake of estimated energy needs for > 5 days   Weight Loss 1-2% in 1 month  5% in 3 months  7.5% in 6 months  10% in 1 year 1-2 % in 1 week  5% in 1 month  7.5% in 3 months  10% in 6 months  20% in 1 year > 2% in 1 week  > 5% in 1 month  > 7.5% in 3 months  > 10% in 6  months  > 20% in 1 year   Physical Findings     None *Mild subcutaneous fat and/or muscle loss  *Mild fluid accumulation  *Stage II decubitus  *Surgical wound or non-healing wound *Mod/severe subcutaneous fat and/or muscle loss  *Mod/severe fluid accumulation  *Stage III or IV decubitus  *Non-healing surgical wound     Provider, please specify diagnosis or diagnoses associated with above clinical findings.    [  ] Cachexia   [x  ] Underweight   [  ] Other Nutritional Diagnosis (please specify):    [  ] Other:    [  ] Clinically Undetermined       Please document in your progress notes daily for the duration of treatment until resolved and include in your discharge summary.

## 2019-01-15 ENCOUNTER — TELEPHONE (OUTPATIENT)
Dept: SURGERY | Facility: HOSPITAL | Age: 64
End: 2019-01-15

## 2019-01-15 NOTE — TELEPHONE ENCOUNTER
----- Message from Radha Perry LPN sent at 1/14/2019  4:13 PM CST -----  Contact: Chermain with [facility]#[phone]      ----- Message -----  From: Kelsey Winkler  Sent: 1/14/2019   2:49 PM  To: Kennedy Benítez Staff    Needs Advice    Reason for call:She wants to speak with you about pt getting her supplies.        Communication Preference: call    Additional Information:

## 2019-01-16 ENCOUNTER — DOCUMENTATION ONLY (OUTPATIENT)
Dept: SURGERY | Facility: HOSPITAL | Age: 64
End: 2019-01-16

## 2019-01-16 DIAGNOSIS — Z43.2 ATTENTION TO ILEOSTOMY: Primary | ICD-10-CM

## 2019-01-16 NOTE — PROGRESS NOTES
Multidisciplinary Rectal Cancer Conference - Treatment Outcome Discussion Summary  1/16/2019  Susannah Xie  [id number]  63 y.o. female      1. Presurgical Evaluation    Pretreatment (clinical) AJCC Stage:  IIIB     Pretreatment CEA level:   Lab Results   Component Value Date    CEA 2.8 11/27/2018       Neoadjuvant therapy: Long Course Chemoradiotherapy    Date of Completion:     2. Surgery     Date: 12/20/18     Procedure: Low Anterior Resection (LAR)    Approach: (open, lap, robotic) Open    Presence of stoma:Yes, .    Postoperative complications: none    Unexpected findings:     Specimen photographs: [x] Reviewed    3. Final Pathology and Stage    Pathological AJCC Stage: [id number], Stage IIIb    Microsatellite instability status: neg    Circumferential resection margin: neg    Distal margin status: Negative 5mm neg CRM with multiple areas tumor deposits within CRM    4. Adjuvant Treatment Recommendation     Regimen Recommended: completion chemotherapy     Referrals: [x]  Medical oncology []  Radiation oncology [] Genetic counseling

## 2019-01-16 NOTE — PROGRESS NOTES
Sent a script to [facility] to begin process to help her get her ostomy supplies free since she is uninsured.

## 2019-01-22 NOTE — PROGRESS NOTES
HPI:  Susannah Xie is a 63 y.o. female with history of low rectal cancer, locally invasive status post neoadjuvant therapy long course followed by ultra-low anterior resection with intersphincteric proctectomy and colonic J-pouch anal anastomosis with diverting loop ileostomy.    Patient reports that she is losing weight.  Her appetite is not increasing as she would expect.  She denies any fevers or nausea or vomiting.  Her stoma output is not more than 1 L per day.  She has had some discharge, mucus from the anus.  She has had spontaneous drainage without knowledge that was coming.  She also reports severe anal pain.      Past Medical History:   Diagnosis Date    Cancer     COPD (chronic obstructive pulmonary disease) with chronic bronchitis     GERD (gastroesophageal reflux disease)     Tobacco abuse         Past Surgical History:   Procedure Laterality Date    APPENDECTOMY      COLECTOMY, WITH COLOANAL ANASTOMOSIS N/A 12/20/2018    Performed by [facility]ides MD at [facility] OR 2ND FLR    COLONOSCOPY N/A 12/12/2018    Performed by Jeyson Pizarro MD at [facility] ENDO (4TH FLR)    CREATION, ILEOSTOMY N/A 12/20/2018    Performed by [facility]ides MD at [facility] OR 2ND FLR    MOBILIZATION, SPLENIC FLEXURE  12/20/2018    Performed by [facility]ides MD at [facility] OR 2ND FLR    PORTACATH PLACEMENT Left     chest    RESECTION, COLON, LOW ANTERIOR  12/20/2018    Performed by [facility]ides MD at [facility] OR 2ND FLR    TOTAL ABDOMINAL HYSTERECTOMY W/ BILATERAL SALPINGOOPHORECTOMY      WRAP-OMENTAL  12/20/2018    [facility]       Review of patient's allergies indicates:   Allergen Reactions    Ceclor [cefaclor] Anaphylaxis       Family History   Problem Relation Age of Onset    Diabetes Mother     Lung cancer Father     Heart disease Brother 53       Social History     Socioeconomic History    Marital status:      Spouse name: Not on file    Number of children: Not on  "file    Years of education: Not on file    Highest education level: Not on file   Social Needs    Financial resource strain: Not on file    Food insecurity - worry: Not on file    Food insecurity - inability: Not on file    Transportation needs - medical: Not on file    Transportation needs - non-medical: Not on file   Occupational History    Not on file   Tobacco Use    Smoking status: Current Every Day Smoker     Packs/day: 1.50     Years: 30.00     Pack years: 45.00     Types: Cigarettes    Smokeless tobacco: Never Used   Substance and Sexual Activity    Alcohol use: Yes     Frequency: Monthly or less     Drinks per session: 1 or 2     Binge frequency: Never     Comment: seldom    Drug use: No    Sexual activity: Not on file   Other Topics Concern    Not on file   Social History Narrative    Not on file       ROS:  GENERAL: No fever, chills, fatigability or weight loss.  Integument: No rashes, redness, icterus  CHEST: Denies VASQUEZ, cyanosis, wheezing, cough and sputum production.  CARDIOVASCULAR: Denies chest pain, PND, orthopnea or reduced exercise tolerance.  GI: Denies abd pain, dysphagia, nausea, vomiting, no hematemesis   : Denies burning on urination, no hematuria, no bacteriuria  MSK: No deformities, swelling, joint pain swelling  Neurologic: No HAs, seizures, weakness, paresthesias, gait problems  /68 (BP Location: Right arm, Patient Position: Sitting, BP Method: Large (Automatic))   Pulse 97   Ht 5' 7" (1.702 m)   Wt 45.9 kg (101 lb 3.1 oz)   BMI 15.85 kg/m²     PE:  General appearance thin, nontoxic  Sclera/ Skin anicteric  AT NC EOMI  Neck supple trachea midline   Chest symmetric, nl excursion, no retractions, breathing comfortably  Abdomen - incision healed primarily without redness, tenderness or drainage  ND soft NT.  No masses, no organomegaly  EXT - no CCE  Neuro:  Mood/ affect nl, alert and oriented x 3, moves all ext's, gait nl    Rectal  Inspection normal appearing  TAMI " low palpable anastomosis, no defects, katina spasm.    FINAL PATHOLOGIC DIAGNOSIS  1. Rectum and anal canal, ultra-low anterior resection (40.0 cm):  - Mucinous adenocarcinoma, see synoptic report  2. Suture on new radial margin:  - Smooth muscle and fibroadipose tissue, negative for carcinoma  SYNOPTIC REPORT  PROCEDURE: Ultra-low anterior resection  TUMOR SITE: Rectum  TUMOR LOCATION: Entirely below the anterior peritoneal reflection  TUMOR SIZE: 3.0 x 2.0 cm  MACROSCOPIC TUMOR PERFORATION: Not definitively identified  MACROSCOPIC INTACTNESS OF MESORECTUM: Grossly complete  HISTOLOGIC TYPE: Mucinous adenocarinoma  HISTOLOGIC GRADE: G2 (moderately differentiated)  TUMOR EXTENSION: Tumor invades through the muscularis propria into  pericolorectal tissue  MARGINS: Radial margin: Main specimen: less than 0.5 mm  Separately submitted specimen #2: Negative for carcinoma  Proximal margin: 34.5 cm  Distal margin: 2.5 cm  Mesenteric vascular margin: 14.5 cm  TREATMENT EFFECT: No known presurgical treatment  LYMPHOVASCULAR INVASION: Present  PERINEURAL INVASION: Present  TUMOR BUDDING: Not identified  TYPE OF POLYP IN WHICH CARCINOMA AROSE: None identified  TUMOR DEPOSITS*: 10 tumor deposits identified  REGIONAL LYMPH NODES*: 18 definitive lymph nodes identified, 3 are  positive for metastatic tumor (3/18)  ANCILLARY STUDIES: MSI panel has been ordered, results will be  issued in a supplemental report when available  PATHOLOGIC STAGE CLASSIFICATION*: pT3 pN1b    Assessment:  Postoperative anal pain secondary to intersphincteric proctectomy and anastomosis.  Wound healed primarily  Postoperative weight loss, slow functional recovery from surgery without evidence of sepsis or wound healing problems  Stage III rectal cancer    Plan:  I have recommended that we check a CT scan of the abdomen and pelvis.  This will be performed at home  Recommend that she begin nutritional supplements BID  Postoperative adjuvant therapy for stage  III rectal cancer  Office visit 1 month

## 2019-01-23 ENCOUNTER — OFFICE VISIT (OUTPATIENT)
Dept: SURGERY | Facility: CLINIC | Age: 64
End: 2019-01-23

## 2019-01-23 ENCOUNTER — OFFICE VISIT (OUTPATIENT)
Dept: WOUND CARE | Facility: CLINIC | Age: 64
End: 2019-01-23

## 2019-01-23 VITALS
WEIGHT: 101.19 LBS | HEART RATE: 97 BPM | BODY MASS INDEX: 15.88 KG/M2 | HEIGHT: 67 IN | DIASTOLIC BLOOD PRESSURE: 68 MMHG | SYSTOLIC BLOOD PRESSURE: 115 MMHG

## 2019-01-23 DIAGNOSIS — Z43.2 ATTENTION TO ILEOSTOMY: Primary | ICD-10-CM

## 2019-01-23 DIAGNOSIS — C20 RECTAL CANCER: Primary | ICD-10-CM

## 2019-01-23 DIAGNOSIS — L24.9 IRRITANT CONTACT DERMATITIS DUE TO ILEOSTOMY: ICD-10-CM

## 2019-01-23 DIAGNOSIS — K94.19 IRRITANT CONTACT DERMATITIS DUE TO ILEOSTOMY: ICD-10-CM

## 2019-01-23 DIAGNOSIS — R10.2 PELVIC PAIN: Primary | ICD-10-CM

## 2019-01-23 PROCEDURE — 99024 POSTOP FOLLOW-UP VISIT: CPT | Mod: ,,, | Performed by: COLON & RECTAL SURGERY

## 2019-01-23 PROCEDURE — 99024 PR POST-OP FOLLOW-UP VISIT: ICD-10-PCS | Mod: ,,, | Performed by: CLINICAL NURSE SPECIALIST

## 2019-01-23 PROCEDURE — 99212 OFFICE O/P EST SF 10 MIN: CPT | Mod: PBBFAC | Performed by: CLINICAL NURSE SPECIALIST

## 2019-01-23 PROCEDURE — 99999 PR PBB SHADOW E&M-EST. PATIENT-LVL II: ICD-10-PCS | Mod: PBBFAC,,, | Performed by: CLINICAL NURSE SPECIALIST

## 2019-01-23 PROCEDURE — 99999 PR PBB SHADOW E&M-EST. PATIENT-LVL III: CPT | Mod: PBBFAC,,, | Performed by: COLON & RECTAL SURGERY

## 2019-01-23 PROCEDURE — 99024 PR POST-OP FOLLOW-UP VISIT: ICD-10-PCS | Mod: ,,, | Performed by: COLON & RECTAL SURGERY

## 2019-01-23 PROCEDURE — 99999 PR PBB SHADOW E&M-EST. PATIENT-LVL III: ICD-10-PCS | Mod: PBBFAC,,, | Performed by: COLON & RECTAL SURGERY

## 2019-01-23 PROCEDURE — 99999 PR PBB SHADOW E&M-EST. PATIENT-LVL II: CPT | Mod: PBBFAC,,, | Performed by: CLINICAL NURSE SPECIALIST

## 2019-01-23 PROCEDURE — 99213 OFFICE O/P EST LOW 20 MIN: CPT | Mod: PBBFAC,27 | Performed by: COLON & RECTAL SURGERY

## 2019-01-23 PROCEDURE — 99024 POSTOP FOLLOW-UP VISIT: CPT | Mod: ,,, | Performed by: CLINICAL NURSE SPECIALIST

## 2019-01-23 RX ORDER — MORPHINE SULFATE 15 MG/1
TABLET, FILM COATED, EXTENDED RELEASE ORAL
COMMUNITY
Start: 2019-01-18

## 2019-01-23 RX ORDER — POTASSIUM CHLORIDE 1500 MG/1
TABLET, EXTENDED RELEASE ORAL
COMMUNITY
Start: 2019-01-18

## 2019-01-23 RX ORDER — CALCIUM CARBONATE 300MG(750)
TABLET,CHEWABLE ORAL
COMMUNITY
Start: 2019-01-08

## 2019-01-23 NOTE — PROGRESS NOTES
This patient is known to me and is here in clinic today for first post op evaluation related to ileostomy. Surgery done 12/26 by Dr. Benavides. The patient reports some problems with  new ostomy. The patient is not receiving home health care.   Pain level today is reported as  1.    The ileostomy is 25 mm ilene medium budded loop stoma.with os pointing downward  The patient is currently  wearing a 1piece pouching system by [employer].   Average wear time is 2 days.   Peristomal skin is slightly scalded    There is no  mucocutaneous separation.  Pt is coping well with the new ostomy.  SUPPLIES/DME: has not insurance but have set her up with [employer] to get supplies for her  Patient enrolled in [employer] [facility] Program. Pt gives verbal permission and understanding a representative will call them regarding samples sent and follow up needs.  Samples requested today based on needs or patient requests today.    Pouching concerns include:          When she sits up , her skin creases and puckers, so convexity will work best for her   She has a sister in law who helps her place her pouch  Patient instructions for pouching:  Cleanse skin with water, dry well.  Apply no sting skin barrier film . Allow to dry  Apply  pouch sized appropriately for stoma. Reviewed sizing of new stoma  Convex , vibha light and pt wants transparent [id number] cut to outer line advised  SPECIAL NEEDS:  Pt counseled on skin care, nutrition, hydration as well as  how to order ostomy supplies.  I spent greater than 50% of this 45 minute visit in face to face counseling.

## 2019-01-25 ENCOUNTER — TELEPHONE (OUTPATIENT)
Dept: SURGERY | Facility: CLINIC | Age: 64
End: 2019-01-25

## 2019-01-25 NOTE — TELEPHONE ENCOUNTER
----- Message from Radha Ancelmo sent at 1/25/2019 10:16 AM CST -----  Contact: [facility]   danitza      Needs Advice    Reason for call: has info for you re appts        Communication Preference:[phone]    Additional Information:

## 2019-01-31 ENCOUNTER — TELEPHONE (OUTPATIENT)
Dept: SURGERY | Facility: CLINIC | Age: 64
End: 2019-01-31

## 2019-01-31 NOTE — TELEPHONE ENCOUNTER
----- Message from Kati Anthony sent at 1/31/2019 10:21 AM CST -----  Contact: Self- [phone]  Kennedy- pt called to determine what address she needs to have her disc mailed to- please contact pt at [phone]

## 2019-02-11 ENCOUNTER — TELEPHONE (OUTPATIENT)
Dept: SURGERY | Facility: CLINIC | Age: 64
End: 2019-02-11

## 2019-02-11 NOTE — TELEPHONE ENCOUNTER
----- Message from Jolene Willett sent at 2/11/2019  9:24 AM CST -----  Contact: pt: [phone]  Needs Advice    Reason for call: pt would like to speak with nurse re 2/13 appt         Communication Preference: pt: [phone]

## 2019-02-11 NOTE — TELEPHONE ENCOUNTER
Spoke with patient. Confirming disc/records received. Radha will call her in the morning to confirm.

## 2019-02-12 ENCOUNTER — TELEPHONE (OUTPATIENT)
Dept: SURGERY | Facility: CLINIC | Age: 64
End: 2019-02-12

## 2019-02-12 NOTE — TELEPHONE ENCOUNTER
Told Susannah I do have the report but no disc. She will bring the disc she has. And get the lab faxed here for Dr Benavides.

## 2019-02-12 NOTE — TELEPHONE ENCOUNTER
----- Message from Sasha oCsta sent at 2/12/2019  2:08 PM CST -----  Contact: Pt:[phone]  .Needs Advice    Reason for call:Pt states she would like to speak with the nurse in regards to her appointment on tomorrow.         Communication Preference:Pt:[phone]    Additional Information:

## 2019-02-13 ENCOUNTER — OFFICE VISIT (OUTPATIENT)
Dept: SURGERY | Facility: CLINIC | Age: 64
End: 2019-02-13

## 2019-02-13 ENCOUNTER — OFFICE VISIT (OUTPATIENT)
Dept: WOUND CARE | Facility: CLINIC | Age: 64
End: 2019-02-13

## 2019-02-13 VITALS
HEART RATE: 102 BPM | SYSTOLIC BLOOD PRESSURE: 127 MMHG | WEIGHT: 101.38 LBS | HEIGHT: 67 IN | BODY MASS INDEX: 15.91 KG/M2 | DIASTOLIC BLOOD PRESSURE: 66 MMHG

## 2019-02-13 DIAGNOSIS — Z48.89 ENCOUNTER FOR POSTOPERATIVE WOUND CHECK: Primary | ICD-10-CM

## 2019-02-13 DIAGNOSIS — Z43.2 ATTENTION TO ILEOSTOMY: Primary | ICD-10-CM

## 2019-02-13 PROCEDURE — 99212 OFFICE O/P EST SF 10 MIN: CPT | Mod: PBBFAC | Performed by: CLINICAL NURSE SPECIALIST

## 2019-02-13 PROCEDURE — 99024 POSTOP FOLLOW-UP VISIT: CPT | Mod: ,,, | Performed by: CLINICAL NURSE SPECIALIST

## 2019-02-13 PROCEDURE — 99999 PR PBB SHADOW E&M-EST. PATIENT-LVL III: ICD-10-PCS | Mod: PBBFAC,,, | Performed by: COLON & RECTAL SURGERY

## 2019-02-13 PROCEDURE — 99024 POSTOP FOLLOW-UP VISIT: CPT | Mod: ,,, | Performed by: COLON & RECTAL SURGERY

## 2019-02-13 PROCEDURE — 99999 PR PBB SHADOW E&M-EST. PATIENT-LVL II: ICD-10-PCS | Mod: PBBFAC,,, | Performed by: CLINICAL NURSE SPECIALIST

## 2019-02-13 PROCEDURE — 99213 OFFICE O/P EST LOW 20 MIN: CPT | Mod: PBBFAC,27 | Performed by: COLON & RECTAL SURGERY

## 2019-02-13 PROCEDURE — 99999 PR PBB SHADOW E&M-EST. PATIENT-LVL III: CPT | Mod: PBBFAC,,, | Performed by: COLON & RECTAL SURGERY

## 2019-02-13 PROCEDURE — 99024 PR POST-OP FOLLOW-UP VISIT: ICD-10-PCS | Mod: ,,, | Performed by: COLON & RECTAL SURGERY

## 2019-02-13 PROCEDURE — 99024 PR POST-OP FOLLOW-UP VISIT: ICD-10-PCS | Mod: ,,, | Performed by: CLINICAL NURSE SPECIALIST

## 2019-02-13 PROCEDURE — 99999 PR PBB SHADOW E&M-EST. PATIENT-LVL II: CPT | Mod: PBBFAC,,, | Performed by: CLINICAL NURSE SPECIALIST

## 2019-02-13 RX ORDER — CIPROFLOXACIN 500 MG/1
500 TABLET ORAL 2 TIMES DAILY
Refills: 0 | COMMUNITY
Start: 2019-01-08

## 2019-02-13 NOTE — PROGRESS NOTES
This patient is known to me and is here in clinic today for second post op evaluation related to ileostomy. Surgery done 12/26 by Dr. Benavides. The patient reports some problems with  new ostomy. The patient is not receiving home health care.   Pain level today is reported as  1.    The ileostomy is 23 mm ilene medium budded loop stoma.with os pointing downward  The patient is currently  wearing a 1piece pouching system by [employer].   Average wear time is 4 days.   Peristomal skin is  Clear today but she has vision issues and cannot see well to place   In light of this will try her in new image so she can see better to place her wafer  SUPPLIES/DME: has not insurance but have set her up with [facility] [employer] to get supplies for her but she states she has not heard back   Patient enrolled in [employer] [facility]. Pt gives verbal permission and understanding a representative will call them regarding samples sent and follow up needs.  Samples requested today based on needs or patient requests today.    Pouching concerns include:          Placed 7/8 convex wafer new image , fits well and and pt likes system , will contact [employer] to see if they can help her with supplies    SPECIAL NEEDS:  Pt counseled on skin care, nutrition, hydration as well as  how to order ostomy supplies.  I spent greater than 50% of this 45 minute visit in face to face counseling.

## 2019-02-15 ENCOUNTER — TELEPHONE (OUTPATIENT)
Dept: SURGERY | Facility: CLINIC | Age: 64
End: 2019-02-15

## 2019-02-21 NOTE — PROGRESS NOTES
HPI:  Susannah Xie is a 63 y.o. female with history of locally invasive low rectal cancer status post neoadjuvant therapy followed by ultra-low anterior resection with colonic J-pouch anal anastomosis and diverting loop ileostomy.    Lower abdominal pain persists.  She denies any nausea or vomiting.  She denies any fever.  Stoma function well. Appetite is improving.  Weight still remains low and she is concerned.  She wants to know if there is anything she can do to increase her nutritional status.    Recent CT scan did not show any evidence of any pelvic or intra-abdominal fluid to suggest intra-abdominal infection.  There is no evidence of bowel inflammation or infection.  Anastomosis appears to be healing well.          Past Medical History:   Diagnosis Date    Cancer     COPD (chronic obstructive pulmonary disease) with chronic bronchitis     GERD (gastroesophageal reflux disease)     Tobacco abuse         Past Surgical History:   Procedure Laterality Date    APPENDECTOMY      COLECTOMY, WITH COLOANAL ANASTOMOSIS N/A 12/20/2018    [facility] at [facility] 2ND FLR    COLONOSCOPY N/A 12/12/2018    Performed by Jeyson [facility]izarro MD at [facility] ENDO (4TH FLR)    CREATION, ILEOSTOMY N/A 12/20/2018    [facility] by [facility] at [facility] OR 2ND FLR    MOBILIZATION, SPLENIC FLEXURE  12/20/2018    Performed by [facility] at [facility] OR 2ND FLR    PORTACATH PLACEMENT Left     chest    RESECTION, COLON, LOW ANTERIOR  12/20/2018    Performed by [facility] at [facility] OR 2ND FLR    TOTAL ABDOMINAL HYSTERECTOMY W/ BILATERAL SALPINGOOPHORECTOMY      WRAP-OMENTAL  12/20/2018    [facility]       Review of patient's allergies indicates:   Allergen Reactions    Ceclor [cefaclor] Anaphylaxis       Family History   Problem Relation Age of Onset    Diabetes Mother     Lung cancer Father     Heart disease Brother 53       Social History  "    Socioeconomic History    Marital status:      Spouse name: None    Number of children: None    Years of education: None    Highest education level: None   Social Needs    Financial resource strain: None    Food insecurity - worry: None    Food insecurity - inability: None    Transportation needs - medical: None    Transportation needs - non-medical: None   Occupational History    None   Tobacco Use    Smoking status: Current Every Day Smoker     Packs/day: 1.50     Years: 30.00     Pack years: 45.00     Types: Cigarettes    Smokeless tobacco: Never Used   Substance and Sexual Activity    Alcohol use: Yes     Frequency: Monthly or less     Drinks per session: 1 or 2     Binge frequency: Never     Comment: seldom    Drug use: No    Sexual activity: None   Other Topics Concern    None   Social History Narrative    None       ROS:  GENERAL: No fever, chills, fatigability or weight loss.  Integument: No rashes, redness, icterus  CHEST: Denies VASQUEZ, cyanosis, wheezing, cough and sputum production.  CARDIOVASCULAR: Denies chest pain, PND, orthopnea or reduced exercise tolerance.  GI: Denies abd pain, dysphagia, nausea, vomiting, no hematemesis   : Denies burning on urination, no hematuria, no bacteriuria  MSK: No deformities, swelling, joint pain swelling  Neurologic: No HAs, seizures, weakness, paresthesias, gait problems    PE:  General appearance healthy nontoxic but very thin  /66 (BP Location: Left arm, Patient Position: Sitting, BP Method: Large (Automatic))   Pulse 102   Ht 5' 7" (1.702 m)   Wt 46 kg (101 lb 6.4 oz)   BMI 15.88 kg/m²   Sclera/ Skin anicteric  LN none palpable  AT NC EOMI  Neck supple trachea midline   Chest symmetric, nl excursion, no retractions, breathing comfortably  Abdomen  ND soft NT.  No masses, no organomegaly  EXT - no CCE  Neuro:  Mood/ affect nl, alert and oriented x 3, moves all ext's, gait nl    Rectal  Inspection normal appearing anus  TAMI normal " sphincter tone, no masses.  No stenosis of low anastomosis      Assessment:  Abdominal wound pain, postoperative, no evidence of intra-abdominal infection the or inflammation  History of rectal cancer    Plan:  The patient needs postoperative adjuvant therapy.  She is apparently scheduled to see her oncologist.  She will return in 3 months to consider ileostomy closure

## 2020-12-22 NOTE — PT/OT/SLP EVAL
"Occupational Therapy   Evaluation and Discharge Note    Name: Susannah Xie  MRN: [id number]  Admitting Diagnosis:  Rectal cancer 1 Day Post-Op    Recommendations:     Discharge Recommendations: (HHOT)  Discharge Equipment Recommendations:  walker, rolling  Barriers to discharge:  None    History:     Occupational Profile:  Living Environment: Pt lives with spouse and pets in a [facility] with 0 JULIUS. Bathroom set up: Tub shower combination  Previous level of function: I in all ADLs/ IADLs:  transports her to doctor's appointments  Roles and Routines: CPA   Equipment Used at home:  none  Assistance upon Discharge: Pt will have assistance from family upon discharge.     Past Medical History:   Diagnosis Date    Cancer     COPD (chronic obstructive pulmonary disease) with chronic bronchitis     GERD (gastroesophageal reflux disease)     Tobacco abuse        Past Surgical History:   Procedure Laterality Date    APPENDECTOMY      COLECTOMY, WITH COLOANAL ANASTOMOSIS N/A 12/20/2018    Per[facility] at [facility] OR 2ND FLR    COLONOSCOPY N/A 12/12/2018    Performed by Jeyson [facility]ro MD at [facility] ENDO (4TH FLR)    CREATION, ILEOSTOMY N/A 12/20/2018    Performed by [facility] at [facility] OR 2ND FLR    MOBILIZATION, SPLENIC FLEXURE  12/20/2018    Performed by [facility] at [facility] OR 2ND FLR    PORTACATH PLACEMENT Left     chest    RESECTION, COLON, LOW ANTERIOR  12/20/2018    Performed by [facility] at [facility] OR 2ND FLR    TOTAL ABDOMINAL HYSTERECTOMY W/ BILATERAL SALPINGOOPHORECTOMY      WRAP-OMENTAL  12/20/2018    [facility]       Subjective     Chief Complaint: none  Patient/Family Comments/goals: Pt agreeable to OT POC. "I want to be home before [place]."    Pain/Comfort:  · Pain Rating 1: (pt did not rate)  · Location - Orientation 1: generalized  · Location 1: abdomen  · Pain Addressed 1: Pre-medicate for activity, Cessation of Activity, " Distraction  · Pain Rating Post-Intervention 1: (pt did  not rate)  · Location - Orientation 2: generalized  · Location 2: abdomen  · Pain Addressed 2: Cessation of Activity, Nurse notified    Patients cultural, spiritual, Quaker conflicts given the current situation: no    Objective:     Communicated with: RN prior to session.  Patient found supine all lines intact, call button in reach, RN notified and Daughter present and peripheral IV(ileostomy) upon OT entry to room.    General Precautions: Standard, fall   Orthopedic Precautions:N/A   Braces: N/A     Occupational Performance:    Bed Mobility:    · Patient completed Rolling/Turning to Left with  supervision  · Patient completed Rolling/Turning to Right with supervision  · Patient completed Scooting/Bridging with stand by assistance and bed rail  · Patient completed Supine to Sit with contact guard assistance 2/2 decreased pace and weakness    Functional Mobility/Transfers:  · Patient completed Sit <> Stand Transfer with supervision  with  no assistive device   · Patient completed Bed <> Chair Transfer using Step Transfer technique with stand by assistance with rolling walker  · Functional Mobility: Pt ambulated ~180 ft with RW and SBA-CGA for line management safety and verbal cueing for correct walker usage. No LOB noted. No RBs required    Activities of Daily Living:  · Lower Body Dressing: supervision to don/ doff BLE socks in sitting    Cognitive/Visual Perceptual:  Cognitive/Psychosocial Skills:     -       Oriented to: Person, Place, Time and Situation   -       Follows Commands/attention:Follows multistep  commands  -       Communication: clear/fluent  -       Memory: No Deficits noted  -       Safety awareness/insight to disability: intact   -       Mood/Affect/Coping skills/emotional control: Appropriate to situation  Visual/Perceptual:      -no deficits noted     Physical Exam:  Balance:    -       Fair  Postural examination/scapula alignment:    -  "      No postural abnormalities identified  Skin integrity: Visible skin intact  Edema:  None noted  Sensation:    -       Intact  Upper Extremity Range of Motion:     -       Right Upper Extremity: WFL  -       Left Upper Extremity: WFL  Upper Extremity Strength:    -       Right Upper Extremity: WFL  -       Left Upper Extremity: WFL   Strength:    -       Right Upper Extremity: WFL  -       Left Upper Extremity: WFL  Fine Motor Coordination:    -       Intact    AMPAC 6 Click ADL:  AMPAC Total Score:      Treatment & Education:  Educated pt on the following:  - OT POC/ Role of OT  - Bed mobility safety  - Functional transfer/ mobility safety  - Pursed lip breathing  - Self care independence  Education:    Patient left up in chair with all lines intact, call button in reach, RN notified and RN present    Assessment:     Susannah Xie is a 63 y.o. female with a medical diagnosis of Rectal cancer. Pt tolerated OT/PT Co-evaluation well with no c/o increased pain with activity. Pt presented with no functional impairments that would impact her ability to complete her self care tasks safely on this date. Pt is safe to ambulate to restroom with assist x 1 for line management safety. Educated pt on the importance of staying active by completing functional mobility with assistance and sitting UIC. OT recommends home with home health to assess home environment. At this time, patient is functioning at their prior level of function and does not require further acute OT services.     Clinical Decision Makin.  OT Low:  "Pt evaluation falls under low complexity for evaluation coding due to performance deficits noted in 1-3 areas as stated above and 0 co-morbities affecting current functional status. Data obtained from problem focused assessments. No modifications or assistance was required for completion of evaluation. Only brief occupational profile and history review completed."     Plan:     During this " hospitalization, patient does not require further acute OT services.  Please re-consult if situation changes.    · Plan of Care Reviewed with: patient, daughter    This Plan of care has been discussed with the patient who was involved in its development and understands and is in agreement with the identified goals and treatment plan    GOALS:   Multidisciplinary Problems     Occupational Therapy Goals     Not on file          Multidisciplinary Problems (Resolved)        Problem: Occupational Therapy Goal    Goal Priority Disciplines Outcome Interventions   Occupational Therapy Goal   (Resolved)     OT, PT/OT Outcome(s) achieved                    Time Tracking:     OT Date of Treatment: 12/21/18  OT Start Time: 1010  OT Stop Time: 1034  OT Total Time (min): 24 min    Billable Minutes:Evaluation 15  Therapeutic Activity 9    Mone Harding OT  12/21/2018     8

## (undated) DEVICE — NDL HYPO REG 25G X 1 1/2

## (undated) DEVICE — SUT SILK 2-0 SH 18IN BLACK

## (undated) DEVICE — RETRACTOR LONE STAR 14.1X14.1

## (undated) DEVICE — SEE MEDLINE ITEM 157144

## (undated) DEVICE — Device

## (undated) DEVICE — SUT CTD VICRYL VIL BR CR/SH

## (undated) DEVICE — SUT 3-0 VICRYL SH CR/8 18

## (undated) DEVICE — SUT CTD VICRYL VIL BR SH 27

## (undated) DEVICE — SEE MEDLINE ITEM 146417

## (undated) DEVICE — RELOAD ECHELON FLEX GRN 60MM

## (undated) DEVICE — LEGGINGS 48X31 INCH

## (undated) DEVICE — NDL BOX COUNTER

## (undated) DEVICE — SET IRR URLGY 2LINE UNIV SPIKE

## (undated) DEVICE — SEE MEDLINE ITEM 157117

## (undated) DEVICE — SEE MEDLINE ITEM 152622

## (undated) DEVICE — CLIPPER BLADE MOD 4406 (CAREF)

## (undated) DEVICE — SEE MEDLINE ITEM 156902

## (undated) DEVICE — SUT CTD VICRYL 2-0 VIL BR

## (undated) DEVICE — SUT 1 48IN PDS II VIO MONO

## (undated) DEVICE — STAPLER ECHELON FLEX 60MM 28CM

## (undated) DEVICE — CONNECTOR Y 3/8X3/8X3/8

## (undated) DEVICE — SYR 30CC LUER LOCK

## (undated) DEVICE — CONTAINER SPECIMEN STRL 4OZ

## (undated) DEVICE — SUT MONOCRYL 4-0 PS-1 UND

## (undated) DEVICE — ADHESIVE DERMABOND ADVANCED

## (undated) DEVICE — ELECTRODE REM PLYHSV RETURN 9

## (undated) DEVICE — SET DECANTER MEDICHOICE

## (undated) DEVICE — SYR ONLY LUER LOCK 20CC

## (undated) DEVICE — TRAY FOLEY 16FR INFECTION CONT

## (undated) DEVICE — HOOK STAY ELAS 5MM 8EA/PK

## (undated) DEVICE — SEE L#95700

## (undated) DEVICE — DRAPE ABDOMINAL TIBURON 14X11

## (undated) DEVICE — NDL 22GA X1 1/2 REG BEVEL

## (undated) DEVICE — SEE MEDLINE ITEM 152487

## (undated) DEVICE — SEE MEDLINE ITEM 154981

## (undated) DEVICE — LUBRICANT SURGILUBE 2 OZ

## (undated) DEVICE — POUCH SENSURA MIO 3/8X2 1/8IN

## (undated) DEVICE — SEE MEDLINE ITEM 146347

## (undated) DEVICE — COVER LIGHT HANDLE 80/CA

## (undated) DEVICE — ELECTRODE EXTENDED BLADE

## (undated) DEVICE — SEE MEDLINE ITEM 157181